# Patient Record
Sex: FEMALE | Race: WHITE | NOT HISPANIC OR LATINO | Employment: OTHER | URBAN - METROPOLITAN AREA
[De-identification: names, ages, dates, MRNs, and addresses within clinical notes are randomized per-mention and may not be internally consistent; named-entity substitution may affect disease eponyms.]

---

## 2024-08-20 ENCOUNTER — HOSPITAL ENCOUNTER (EMERGENCY)
Facility: HOSPITAL | Age: 69
Discharge: HOME | End: 2024-08-20
Attending: EMERGENCY MEDICINE
Payer: COMMERCIAL

## 2024-08-20 ENCOUNTER — APPOINTMENT (EMERGENCY)
Dept: RADIOLOGY | Facility: HOSPITAL | Age: 69
End: 2024-08-20
Payer: COMMERCIAL

## 2024-08-20 VITALS
OXYGEN SATURATION: 95 % | RESPIRATION RATE: 18 BRPM | HEART RATE: 102 BPM | DIASTOLIC BLOOD PRESSURE: 71 MMHG | SYSTOLIC BLOOD PRESSURE: 175 MMHG | TEMPERATURE: 97.7 F

## 2024-08-20 DIAGNOSIS — S62.102A CLOSED FRACTURE OF LEFT WRIST, INITIAL ENCOUNTER: Primary | ICD-10-CM

## 2024-08-20 PROCEDURE — 25605 CLTX DST RDL FX/EPHYS SEP W/: CPT | Performed by: EMERGENCY MEDICINE

## 2024-08-20 PROCEDURE — 99284 EMERGENCY DEPT VISIT MOD MDM: CPT | Performed by: EMERGENCY MEDICINE

## 2024-08-20 PROCEDURE — 99283 EMERGENCY DEPT VISIT LOW MDM: CPT

## 2024-08-20 PROCEDURE — 73110 X-RAY EXAM OF WRIST: CPT

## 2024-08-20 RX ORDER — OXYCODONE HYDROCHLORIDE 5 MG/1
5 TABLET ORAL EVERY 4 HOURS PRN
Qty: 15 TABLET | Refills: 0 | Status: SHIPPED | OUTPATIENT
Start: 2024-08-20 | End: 2024-08-29

## 2024-08-20 RX ORDER — NAPROXEN 500 MG/1
500 TABLET ORAL 2 TIMES DAILY WITH MEALS
Qty: 30 TABLET | Refills: 0 | Status: ON HOLD | OUTPATIENT
Start: 2024-08-20 | End: 2024-08-29

## 2024-08-20 RX ORDER — LIDOCAINE HYDROCHLORIDE 10 MG/ML
20 INJECTION, SOLUTION EPIDURAL; INFILTRATION; INTRACAUDAL; PERINEURAL ONCE
Status: COMPLETED | OUTPATIENT
Start: 2024-08-20 | End: 2024-08-20

## 2024-08-20 RX ADMIN — LIDOCAINE HYDROCHLORIDE 20 ML: 10 INJECTION, SOLUTION EPIDURAL; INFILTRATION; INTRACAUDAL at 16:40

## 2024-08-20 NOTE — ED NOTES
Patient ambulated out of the ED, AAOx4 resp even and unlabored with no S$S of distress.       Rambo Novoa RN  08/20/24 0708

## 2024-08-20 NOTE — ED PROVIDER NOTES
History  Chief Complaint   Patient presents with   • Wrist Injury     [place]. Denies hitting head or LOC.      69-year-old female presents for evaluation with a left wrist injury.  Patient states that she tripped and fell, landing on her outstretched left arm.  She denies any head strike or loss of consciousness.  Patient has a notable deformity to the left wrist, and is endorsing pain in the wrist.  She denies any other injuries from the fall.  Denies any associated numbness, tingling, or weakness in the distal left hand.      None       History reviewed. No pertinent past medical history.    Past Surgical History:   Procedure Laterality Date   • APPENDECTOMY         History reviewed. No pertinent family history.  I have reviewed and agree with the history as documented.    E-Cigarette/Vaping   • E-Cigarette Use Never User      E-Cigarette/Vaping Substances     Social History     Tobacco Use   • Smoking status: Never   • Smokeless tobacco: Never   Vaping Use   • Vaping status: Never Used   Substance Use Topics   • Alcohol use: Never   • Drug use: Never       Review of Systems   Musculoskeletal:  Positive for arthralgias.   All other systems reviewed and are negative.      Physical Exam  Physical Exam  Vitals and nursing note reviewed.   Constitutional:       General: She is awake. She is not in acute distress.     Appearance: She is not toxic-appearing.   HENT:      Head: Normocephalic and atraumatic.   Eyes:      General: Vision grossly intact. Gaze aligned appropriately.   Cardiovascular:      Rate and Rhythm: Normal rate and regular rhythm.   Pulmonary:      Effort: Pulmonary effort is normal. No respiratory distress.   Musculoskeletal:      Left wrist: Swelling, deformity and tenderness present. Decreased range of motion. Normal pulse.      Left hand: Normal range of motion. Normal sensation. Normal capillary refill.      Cervical back: Full passive range of motion  without pain and neck supple.   Skin:     General: Skin is warm and dry.   Neurological:      General: No focal deficit present.      Mental Status: She is alert and oriented to person, place, and time.       Vital Signs  ED Triage Vitals   Temperature Pulse Respirations Blood Pressure SpO2   08/20/24 1534 08/20/24 1534 08/20/24 1534 08/20/24 1534 08/20/24 1534   97.7 °F (36.5 °C) 60 20 (!) 176/79 95 %      Temp Source Heart Rate Source Patient Position - Orthostatic VS BP Location FiO2 (%)   08/20/24 1534 08/20/24 1534 08/20/24 1534 08/20/24 1534 --   Oral Monitor Sitting Left arm       Pain Score       08/20/24 1632       7           Vitals:    08/20/24 1534   BP: (!) 176/79   Pulse: 60   Patient Position - Orthostatic VS: Sitting         Visual Acuity      ED Medications  Medications   lidocaine (PF) (XYLOCAINE-MPF) 1 % injection 20 mL (20 mL Infiltration Given by Other 8/20/24 1640)       Diagnostic Studies  Results Reviewed       None                   XR wrist 3+ views LEFT   ED Interpretation by Katja Parra DO (08/20 1629)   Dorsally displaced distal radius and ulna fractures.      Final Result by Cassandra Vo MD (08/20 2268)   Comminuted intra-articular fracture of the distal radius with dorsally directed distal radial articular surface with radial shortening.   Comminuted fracture of the distal ulna      Computerized Assisted Algorithm (CAA) may have been used to analyze all applicable images.            Workstation performed: [id number]         XR wrist 3+ views LEFT    (Results Pending)              Procedures  Orthopedic injury treatment    Date/Time: 8/20/2024 6:32 PM    Performed by: Katja Parra DO  Authorized by: Katja Parra DO    Patient Location:  ED  [place] Protocol:  Procedure performed by: (ED tech)  Consent: Verbal consent obtained.  Risks and benefits: risks, benefits and alternatives were discussed  Consent given by: patient  Required items: required blood products,  implants, devices, and special equipment available  Patient identity confirmed: arm band    Injury location:  Wrist  Location details:  Left wrist  Injury type:  Fracture  Fracture type: distal radius and ulnar styloid    Fracture type: distal radius and ulnar styloid    Neurovascular status: Neurovascularly intact    Distal perfusion: normal    Neurological function: normal    Range of motion: reduced    Local anesthesia used?: Yes    General anesthesia used?: No    Anesthesia:  Hematoma block  Local anesthetic:  Lidocaine 1% without epinephrine  Anesthetic total (ml):  10  Skeletal traction used?: Yes (Patient hung in finger traps)    Immobilization:  Splint  Splint type:  Sugar tong  Supplies used:  Cotton padding, Ortho-Glass and elastic bandage  Neurovascular status: Neurovascularly intact    Distal perfusion: normal    Neurological function: normal    Range of motion: unchanged    Patient tolerance:  Patient tolerated the procedure well with no immediate complications           ED Course                                               Medical Decision Making  Amount and/or Complexity of Data Reviewed  Radiology: ordered and independent interpretation performed.    Risk  Prescription drug management.               Disposition  Final diagnoses:   None     ED Disposition       None          Follow-up Information    None         Patient's Medications    No medications on file       No discharge procedures on file.    PDMP Review       None            ED Provider  Electronically Signed by

## 2024-08-21 ENCOUNTER — TELEPHONE (OUTPATIENT)
Age: 69
End: 2024-08-21

## 2024-08-21 NOTE — TELEPHONE ENCOUNTER
Hello,  Please advise if the following patient can be forced onto the schedule:  Patient: Kylee BRICENO  INSURANCE: [employer]   Call back #: [phone]  Reason for appointment: Wrist Fracture  Requested doctor/location: [facility] Doctor    Thank you in advance!

## 2024-08-21 NOTE — TELEPHONE ENCOUNTER
Called & spoke to patient. She elected to come in Friday at the [place] office. She is aware of location. Agreed to come in @ 10am. Patient was very pleasant & was grateful.

## 2024-08-23 ENCOUNTER — ANESTHESIA EVENT (OUTPATIENT)
Age: 69
End: 2024-08-23
Payer: COMMERCIAL

## 2024-08-23 ENCOUNTER — OFFICE VISIT (OUTPATIENT)
Dept: OBGYN CLINIC | Facility: CLINIC | Age: 69
End: 2024-08-23
Payer: COMMERCIAL

## 2024-08-23 DIAGNOSIS — S52.502A CLOSED FRACTURE DISTAL RADIUS AND ULNA, LEFT, INITIAL ENCOUNTER: Primary | ICD-10-CM

## 2024-08-23 DIAGNOSIS — S62.102A CLOSED FRACTURE OF LEFT WRIST, INITIAL ENCOUNTER: ICD-10-CM

## 2024-08-23 DIAGNOSIS — S52.602A CLOSED FRACTURE DISTAL RADIUS AND ULNA, LEFT, INITIAL ENCOUNTER: Primary | ICD-10-CM

## 2024-08-23 PROCEDURE — 99204 OFFICE O/P NEW MOD 45 MIN: CPT | Performed by: ORTHOPAEDIC SURGERY

## 2024-08-23 RX ORDER — ACETAMINOPHEN 325 MG/1
975 TABLET ORAL ONCE
Status: CANCELLED | OUTPATIENT
Start: 2024-08-29 | End: 2024-08-23

## 2024-08-23 RX ORDER — CEFAZOLIN SODIUM 2 G/50ML
2000 SOLUTION INTRAVENOUS ONCE
Status: CANCELLED | OUTPATIENT
Start: 2024-08-29 | End: 2024-08-23

## 2024-08-23 RX ORDER — ACETAMINOPHEN 160 MG/1
160 BAR, CHEWABLE ORAL EVERY 6 HOURS PRN
COMMUNITY
End: 2024-08-29

## 2024-08-23 NOTE — H&P
The HAND & UPPER EXTREMITY OFFICE VISIT   Referred By:  Katja Parra, Do  [address] [place] [address]      Chief Complaint:     Left wrist pain    History of Present Illness:   69 y.o., right hand dominant female presents with left wrist pain after trip and fall 3 days prior to presentation.  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  She was diagnosed the fracture had a closed reduction and placed in a splint.  Emergency department note was reviewed today.    Presents today to discuss further treatment options.  She states that her pain has been improving in the splint and she is working on moving her fingers.  She feels that her finger movement has been improving as well.  Denies numbness or tingling.      She lives with her  and is very active.  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  This requires her to use her hand for a lot of functions.      Past Medical History:  History reviewed. No pertinent past medical history.  Past Surgical History:   Procedure Laterality Date    APPENDECTOMY       History reviewed. No pertinent family history.  Social History     Socioeconomic History    Marital status: /Civil Union     Spouse name: Not on file    Number of children: Not on file    Years of education: Not on file    Highest education level: Not on file   Occupational History    Not on file   Tobacco Use    Smoking status: Never    Smokeless tobacco: Never   Vaping Use    Vaping status: Never Used   Substance and Sexual Activity    Alcohol use: Never    Drug use: Never    Sexual activity: Not on file   Other Topics Concern    Not on file   Social History Narrative    Not on file     Social Determinants of Health     Financial Resource Strain: Not on file   Food Insecurity: Not on file   Transportation Needs: Not on file   Physical Activity: Not on file   Stress: Not on file    Social Connections: Unknown (6/18/2024)    Received from [employer]     How often do you feel lonely or isolated from those around you? (Adult - for ages 18 years and over): Not on file   Intimate Partner Violence: Not on file   Housing Stability: Not on file     Scheduled Meds:  Continuous Infusions:No current facility-administered medications for this visit.    PRN Meds:.  Allergies   Allergen Reactions    Penicillins Rash           Physical Examination:    There were no vitals taken for this visit.    Gen: A&Ox3, NAD  Cardiac: regular rate  Chest: non labored breathing  Abdomen: Non-distended    Right Upper Extremity:  Skin CDI  No obvious deformity of the shoulder, arm, elbow, forearm, wrist, hand  Nontender  Composite fist  Sensation intact to light touch in the axillary median, ulnar, and radial nerve distributions  2+RP      Left Upper Extremity:  Splint intact, left on for examination  Exposed skin intact  Intact finger range of motion within the confines of the splint without pain  Tender about the radius  Sensation intact to light touch in the axillary median, ulnar, and radial nerve distributions  Warm well-perfused fingers      Studies:  Radiographs: I personally reviewed and independently interpreted the available radiographs.  8/20/2024: Radiographs of the left wrist, multiple views pre and post reduction attempt, demonstrate displaced comminuted intra-articular distal radius and ulnar head fractures with 100% dorsal translation initially.  The dorsal translation was improved about 50% after postreduction.  Continue significant dorsal tilt and shortening with loss of radial clinician.  Ulnar head fracture and slightly improved alignment.       Assessment and Plan:  1. Closed fracture distal radius and ulna, left, initial encounter  Case request operating room: OPEN REDUCTION W/ INTERNAL FIXATION (ORIF) Left RADIUS / ULNA (WRIST)    Diet NPO; Sips with meds    Nursing  "Communication Warmimg Interventions Implemented    Nursing Communication CHG bath, have staff wash entire body (neck down) per pre-op bathing protocol. Routine, evening prior to, and day of surgery.    Void on call to OR    Insert peripheral IV    acetaminophen (TYLENOL) tablet 975 mg    ceFAZolin (ANCEF) 2,000 mg in sodium chloride 0.9 % 50 mL IVPB    EKG 12 lead    Basic metabolic panel    Case request operating room: OPEN REDUCTION W/ INTERNAL FIXATION (ORIF) Left RADIUS / ULNA (WRIST)      2. Closed fracture of left wrist, initial encounter  Ambulatory Referral to Orthopedic Surgery          69 y.o. female presents with signs and symptoms consistent with the above diagnosis.  We discussed the natural history of this condition and its pathogenesis.  We discussed operative and nonoperative treatment options.    We had an extensive discussion regarding the risks and benefits of operative versus nonoperative management.  We discussed the ASSH and AAOS recommendations for distal radius fractures in patients greater than 65 years old \"considered elderly\".  We discussed the nuances in the literature that are not well represented and this recommendation and I do think that patient activity level and functional age is more important than chronological age.  She is more active than many people younger than 65.  I also do think that surgery would allow for earlier return to activities compared to casting.  We discussed that casting involves at least 2 months of immobilization with the weekly x-rays for the first 3 weeks.  Her fracture is at risk for interval displacement given its very distal nature, intra-articular extension and dorsal comminution as well as the initial amount of fracture displacement and having an associated ulnar fracture.  In her case I do think she would be better served by operative intervention.  She does agree with this.  We discussed the risks of surgery include but not limited to infection, " bleeding, wound healing complications, damage to surrounding structures, hardware failure, nonunion/malunion, stiffness, need for revision surgery, complex regional pain syndrome, anesthetic complications.  She understands these risks and wishes to proceed with surgery.  We will plan for likely bridge plating of the distal radius given the very distal nature of the fracture.  We discussed that this will require plate removal in approximately 3 months postoperatively.  She may also require additional fixation to her ulnar head.    All questions answered and informed consent signed in the office today.    Open reduction internal fixation of her left distal radius fracture and ulnar fracture  Regional anesthesia with sedation    she expressed understanding of the plan and agreed. We encouraged them to contact our office with any questions or concerns.         Chapin Groves MD  Hand and Upper Extremity Surgery        *This note was dictated using Dragon voice recognition software. Please excuse any word substitutions or errors.*

## 2024-08-23 NOTE — H&P (VIEW-ONLY)
The HAND & UPPER EXTREMITY OFFICE VISIT   Referred By:  Katja Parra, Do  [address] [place] [address]      Chief Complaint:     Left wrist pain    History of Present Illness:   69 y.o., right hand dominant female presents with left wrist pain after trip and fall 3 days prior to presentation.  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  She was diagnosed the fracture had a closed reduction and placed in a splint.  Emergency department note was reviewed today.    Presents today to discuss further treatment options.  She states that her pain has been improving in the splint and she is working on moving her fingers.  She feels that her finger movement has been improving as well.  Denies numbness or tingling.      She lives with her  and is very active.  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  This requires her to use her hand for a lot of functions.      Past Medical History:  History reviewed. No pertinent past medical history.  Past Surgical History:   Procedure Laterality Date    APPENDECTOMY       History reviewed. No pertinent family history.  Social History     Socioeconomic History    Marital status: /Civil Union     Spouse name: Not on file    Number of children: Not on file    Years of education: Not on file    Highest education level: Not on file   Occupational History    Not on file   Tobacco Use    Smoking status: Never    Smokeless tobacco: Never   Vaping Use    Vaping status: Never Used   Substance and Sexual Activity    Alcohol use: Never    Drug use: Never    Sexual activity: Not on file   Other Topics Concern    Not on file   Social History Narrative    Not on file     Social Determinants of Health     Financial Resource Strain: Not on file   Food Insecurity: Not on file   Transportation Needs: Not on file   Physical Activity: Not on file   Stress: Not on file    Social Connections: Unknown (6/18/2024)    Received from [employer]     How often do you feel lonely or isolated from those around you? (Adult - for ages 18 years and over): Not on file   Intimate Partner Violence: Not on file   Housing Stability: Not on file     Scheduled Meds:  Continuous Infusions:No current facility-administered medications for this visit.    PRN Meds:.  Allergies   Allergen Reactions    Penicillins Rash           Physical Examination:    There were no vitals taken for this visit.    Gen: A&Ox3, NAD  Cardiac: regular rate  Chest: non labored breathing  Abdomen: Non-distended    Right Upper Extremity:  Skin CDI  No obvious deformity of the shoulder, arm, elbow, forearm, wrist, hand  Nontender  Composite fist  Sensation intact to light touch in the axillary median, ulnar, and radial nerve distributions  2+RP      Left Upper Extremity:  Splint intact, left on for examination  Exposed skin intact  Intact finger range of motion within the confines of the splint without pain  Tender about the radius  Sensation intact to light touch in the axillary median, ulnar, and radial nerve distributions  Warm well-perfused fingers      Studies:  Radiographs: I personally reviewed and independently interpreted the available radiographs.  8/20/2024: Radiographs of the left wrist, multiple views pre and post reduction attempt, demonstrate displaced comminuted intra-articular distal radius and ulnar head fractures with 100% dorsal translation initially.  The dorsal translation was improved about 50% after postreduction.  Continue significant dorsal tilt and shortening with loss of radial clinician.  Ulnar head fracture and slightly improved alignment.       Assessment and Plan:  1. Closed fracture distal radius and ulna, left, initial encounter  Case request operating room: OPEN REDUCTION W/ INTERNAL FIXATION (ORIF) Left RADIUS / ULNA (WRIST)    Diet NPO; Sips with meds    Nursing  "Communication Warmimg Interventions Implemented    Nursing Communication CHG bath, have staff wash entire body (neck down) per pre-op bathing protocol. Routine, evening prior to, and day of surgery.    Void on call to OR    Insert peripheral IV    acetaminophen (TYLENOL) tablet 975 mg    ceFAZolin (ANCEF) 2,000 mg in sodium chloride 0.9 % 50 mL IVPB    EKG 12 lead    Basic metabolic panel    Case request operating room: OPEN REDUCTION W/ INTERNAL FIXATION (ORIF) Left RADIUS / ULNA (WRIST)      2. Closed fracture of left wrist, initial encounter  Ambulatory Referral to Orthopedic Surgery          69 y.o. female presents with signs and symptoms consistent with the above diagnosis.  We discussed the natural history of this condition and its pathogenesis.  We discussed operative and nonoperative treatment options.    We had an extensive discussion regarding the risks and benefits of operative versus nonoperative management.  We discussed the ASSH and AAOS recommendations for distal radius fractures in patients greater than 65 years old \"considered elderly\".  We discussed the nuances in the literature that are not well represented and this recommendation and I do think that patient activity level and functional age is more important than chronological age.  She is more active than many people younger than 65.  I also do think that surgery would allow for earlier return to activities compared to casting.  We discussed that casting involves at least 2 months of immobilization with the weekly x-rays for the first 3 weeks.  Her fracture is at risk for interval displacement given its very distal nature, intra-articular extension and dorsal comminution as well as the initial amount of fracture displacement and having an associated ulnar fracture.  In her case I do think she would be better served by operative intervention.  She does agree with this.  We discussed the risks of surgery include but not limited to infection, " bleeding, wound healing complications, damage to surrounding structures, hardware failure, nonunion/malunion, stiffness, need for revision surgery, complex regional pain syndrome, anesthetic complications.  She understands these risks and wishes to proceed with surgery.  We will plan for likely bridge plating of the distal radius given the very distal nature of the fracture.  We discussed that this will require plate removal in approximately 3 months postoperatively.  She may also require additional fixation to her ulnar head.    All questions answered and informed consent signed in the office today.    Open reduction internal fixation of her left distal radius fracture and ulnar fracture  Regional anesthesia with sedation    she expressed understanding of the plan and agreed. We encouraged them to contact our office with any questions or concerns.         Chapin Groves MD  Hand and Upper Extremity Surgery        *This note was dictated using Dragon voice recognition software. Please excuse any word substitutions or errors.*

## 2024-08-23 NOTE — PROGRESS NOTES
The HAND & UPPER EXTREMITY OFFICE VISIT   Referred By:  Katja Parra, Do  [address] [place] [address]      Chief Complaint:     Left wrist pain    History of Present Illness:   69 y.o., right hand dominant female presents with left wrist pain after trip and fall 3 days prior to presentation.  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  She was diagnosed the fracture had a closed reduction and placed in a splint.  Emergency department note was reviewed today.    Presents today to discuss further treatment options.  She states that her pain has been improving in the splint and she is working on moving her fingers.  She feels that her finger movement has been improving as well.  Denies numbness or tingling.      She lives with her  and is very active.  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  This requires her to use her hand for a lot of functions.      Past Medical History:  History reviewed. No pertinent past medical history.  Past Surgical History:   Procedure Laterality Date    APPENDECTOMY       History reviewed. No pertinent family history.  Social History     Socioeconomic History    Marital status: /Civil Union     Spouse name: Not on file    Number of children: Not on file    Years of education: Not on file    Highest education level: Not on file   Occupational History    Not on file   Tobacco Use    Smoking status: Never    Smokeless tobacco: Never   Vaping Use    Vaping status: Never Used   Substance and Sexual Activity    Alcohol use: Never    Drug use: Never    Sexual activity: Not on file   Other Topics Concern    Not on file   Social History Narrative    Not on file     Social Determinants of Health     Financial Resource Strain: Not on file   Food Insecurity: Not on file   Transportation Needs: Not on file   Physical Activity: Not on file   Stress: Not on file    Social Connections: Unknown (6/18/2024)    Received from [facility]     How often do you feel lonely or isolated from those around you? (Adult - for ages 18 years and over): Not on file   Intimate Partner Violence: Not on file   Housing Stability: Not on file     Scheduled Meds:  Continuous Infusions:No current facility-administered medications for this visit.    PRN Meds:.  Allergies   Allergen Reactions    Penicillins Rash           Physical Examination:    There were no vitals taken for this visit.    Gen: A&Ox3, NAD  Cardiac: regular rate  Chest: non labored breathing  Abdomen: Non-distended    Right Upper Extremity:  Skin CDI  No obvious deformity of the shoulder, arm, elbow, forearm, wrist, hand  Nontender  Composite fist  Sensation intact to light touch in the axillary median, ulnar, and radial nerve distributions  2+RP      Left Upper Extremity:  Splint intact, left on for examination  Exposed skin intact  Intact finger range of motion within the confines of the splint without pain  Tender about the radius  Sensation intact to light touch in the axillary median, ulnar, and radial nerve distributions  Warm well-perfused fingers      Studies:  Radiographs: I personally reviewed and independently interpreted the available radiographs.  8/20/2024: Radiographs of the left wrist, multiple views pre and post reduction attempt, demonstrate displaced comminuted intra-articular distal radius and ulnar head fractures with 100% dorsal translation initially.  The dorsal translation was improved about 50% after postreduction.  Continue significant dorsal tilt and shortening with loss of radial clinician.  Ulnar head fracture and slightly improved alignment.       Assessment and Plan:  1. Closed fracture distal radius and ulna, left, initial encounter  Case request operating room: OPEN REDUCTION W/ INTERNAL FIXATION (ORIF) Left RADIUS / ULNA (WRIST)    Diet NPO; Sips with meds    Nursing  "Communication Warmimg Interventions Implemented    Nursing Communication CHG bath, have staff wash entire body (neck down) per pre-op bathing protocol. Routine, evening prior to, and day of surgery.    Void on call to OR    Insert peripheral IV    acetaminophen (TYLENOL) tablet 975 mg    ceFAZolin (ANCEF) 2,000 mg in sodium chloride 0.9 % 50 mL IVPB    EKG 12 lead    Basic metabolic panel    Case request operating room: OPEN REDUCTION W/ INTERNAL FIXATION (ORIF) Left RADIUS / ULNA (WRIST)      2. Closed fracture of left wrist, initial encounter  Ambulatory Referral to Orthopedic Surgery          69 y.o. female presents with signs and symptoms consistent with the above diagnosis.  We discussed the natural history of this condition and its pathogenesis.  We discussed operative and nonoperative treatment options.    We had an extensive discussion regarding the risks and benefits of operative versus nonoperative management.  We discussed the ASSH and AAOS recommendations for distal radius fractures in patients greater than 65 years old \"considered elderly\".  We discussed the nuances in the literature that are not well represented and this recommendation and I do think that patient activity level and functional age is more important than chronological age.  She is more active than many people younger than 65.  I also do think that surgery would allow for earlier return to activities compared to casting.  We discussed that casting involves at least 2 months of immobilization with the weekly x-rays for the first 3 weeks.  Her fracture is at risk for interval displacement given its very distal nature, intra-articular extension and dorsal comminution as well as the initial amount of fracture displacement and having an associated ulnar fracture.  In her case I do think she would be better served by operative intervention.  She does agree with this.  We discussed the risks of surgery include but not limited to infection, " bleeding, wound healing complications, damage to surrounding structures, hardware failure, nonunion/malunion, stiffness, need for revision surgery, complex regional pain syndrome, anesthetic complications.  She understands these risks and wishes to proceed with surgery.  We will plan for likely bridge plating of the distal radius given the very distal nature of the fracture.  We discussed that this will require plate removal in approximately 3 months postoperatively.  She may also require additional fixation to her ulnar head.    All questions answered and informed consent signed in the office today.    Open reduction internal fixation of her left distal radius fracture and ulnar fracture  Regional anesthesia with sedation    she expressed understanding of the plan and agreed. We encouraged them to contact our office with any questions or concerns.         Chapin Groves MD  Hand and Upper Extremity Surgery        *This note was dictated using Dragon voice recognition software. Please excuse any word substitutions or errors.*

## 2024-08-24 ENCOUNTER — APPOINTMENT (OUTPATIENT)
Dept: LAB | Facility: HOSPITAL | Age: 69
End: 2024-08-24
Payer: COMMERCIAL

## 2024-08-24 DIAGNOSIS — S52.602A CLOSED FRACTURE DISTAL RADIUS AND ULNA, LEFT, INITIAL ENCOUNTER: ICD-10-CM

## 2024-08-24 DIAGNOSIS — S52.502A CLOSED FRACTURE DISTAL RADIUS AND ULNA, LEFT, INITIAL ENCOUNTER: ICD-10-CM

## 2024-08-26 ENCOUNTER — APPOINTMENT (OUTPATIENT)
Dept: LAB | Facility: CLINIC | Age: 69
End: 2024-08-26
Payer: COMMERCIAL

## 2024-08-26 DIAGNOSIS — S52.602A CLOSED FRACTURE DISTAL RADIUS AND ULNA, LEFT, INITIAL ENCOUNTER: ICD-10-CM

## 2024-08-26 DIAGNOSIS — S52.502A CLOSED FRACTURE DISTAL RADIUS AND ULNA, LEFT, INITIAL ENCOUNTER: ICD-10-CM

## 2024-08-26 LAB
ANION GAP SERPL CALCULATED.3IONS-SCNC: 8 MMOL/L (ref 4–13)
BUN SERPL-MCNC: 17 MG/DL (ref 5–25)
CALCIUM SERPL-MCNC: 9 MG/DL (ref 8.4–10.2)
CHLORIDE SERPL-SCNC: 104 MMOL/L (ref 96–108)
CO2 SERPL-SCNC: 27 MMOL/L (ref 21–32)
CREAT SERPL-MCNC: 0.59 MG/DL (ref 0.6–1.3)
GFR SERPL CREATININE-BSD FRML MDRD: 92 ML/MIN/1.73SQ M
GLUCOSE P FAST SERPL-MCNC: 90 MG/DL (ref 65–99)
POTASSIUM SERPL-SCNC: 4.1 MMOL/L (ref 3.5–5.3)
SODIUM SERPL-SCNC: 139 MMOL/L (ref 135–147)

## 2024-08-26 PROCEDURE — 36415 COLL VENOUS BLD VENIPUNCTURE: CPT

## 2024-08-26 PROCEDURE — 80048 BASIC METABOLIC PNL TOTAL CA: CPT

## 2024-08-27 ENCOUNTER — ANESTHESIA EVENT (OUTPATIENT)
Age: 69
End: 2024-08-27

## 2024-08-27 ENCOUNTER — ANESTHESIA (OUTPATIENT)
Age: 69
End: 2024-08-27

## 2024-08-27 LAB
ATRIAL RATE: 61 BPM
P AXIS: 61 DEGREES
PR INTERVAL: 182 MS
QRS AXIS: 54 DEGREES
QRSD INTERVAL: 78 MS
QT INTERVAL: 428 MS
QTC INTERVAL: 430 MS
T WAVE AXIS: 58 DEGREES
VENTRICULAR RATE: 61 BPM

## 2024-08-27 PROCEDURE — 93010 ELECTROCARDIOGRAM REPORT: CPT | Performed by: STUDENT IN AN ORGANIZED HEALTH CARE EDUCATION/TRAINING PROGRAM

## 2024-08-28 PROBLEM — S52.502A CLOSED FRACTURE DISTAL RADIUS AND ULNA, LEFT, INITIAL ENCOUNTER: Status: ACTIVE | Noted: 2024-08-28

## 2024-08-28 PROBLEM — S52.602A CLOSED FRACTURE DISTAL RADIUS AND ULNA, LEFT, INITIAL ENCOUNTER: Status: ACTIVE | Noted: 2024-08-28

## 2024-08-28 RX ORDER — NAPROXEN 500 MG/1
500 TABLET ORAL 2 TIMES DAILY WITH MEALS
Qty: 30 TABLET | Refills: 1 | Status: CANCELLED | OUTPATIENT
Start: 2024-08-28

## 2024-08-28 RX ORDER — METOPROLOL TARTRATE 50 MG
50 TABLET ORAL EVERY 12 HOURS SCHEDULED
COMMUNITY

## 2024-08-28 RX ORDER — LISINOPRIL 10 MG/1
10 TABLET ORAL DAILY
COMMUNITY

## 2024-08-28 RX ORDER — SERTRALINE HYDROCHLORIDE 100 MG/1
100 TABLET, FILM COATED ORAL DAILY
COMMUNITY

## 2024-08-28 NOTE — PRE-PROCEDURE INSTRUCTIONS
Pre-Surgery Instructions:   Medication Instructions    acetaminophen (TYLENOL) 160 MG chewable tablet Uses PRN- OK to take day of surgery    lisinopril (ZESTRIL) 10 mg tablet Hold day of surgery.    metoprolol tartrate (LOPRESSOR) 50 mg tablet Take day of surgery.    naproxen (Naprosyn) 500 mg tablet Hold until after surgery    oxyCODONE (Roxicodone) 5 immediate release tablet Uses PRN- OK to take day of surgery    sertraline (ZOLOFT) 100 mg tablet Take day of surgery.     Medication instructions for day surgery reviewed. Please use only a sip of water to take your instructed medications. Avoid all over the counter vitamins, supplements and NSAIDS for one week prior to surgery per anesthesia guidelines. Tylenol is ok to take as needed.     You will receive a call one business day prior to surgery with an arrival time and hospital directions. If your surgery is scheduled on a Monday, the hospital will be calling you on the Friday prior to your surgery. If you have not heard from anyone by 8pm, please call the hospital supervisor through the hospital  at [phone]. ([place] [phone] or [place] [phone]).    Do not eat or drink anything after midnight the night before your surgery, including candy, mints, lifesavers, or chewing gum. Do not drink alcohol 24hrs before your surgery. Try not to smoke at least 24hrs before your surgery.       Follow the pre surgery showering instructions as listed in the “My Surgical Experience Booklet” or otherwise provided by your surgeon's office. Do not use a blade to shave the surgical area 1 week before surgery. It is okay to use a clean electric clippers up to 24 hours before surgery. Do not apply any lotions, creams, including makeup, cologne, deodorant, or perfumes after showering on the day of your surgery. Do not use dry shampoo, hair spray, hair gel, or any type of hair products.     No contact lenses, eye make-up, or artificial eyelashes. Remove nail  polish, including gel polish, and any artificial, gel, or acrylic nails if possible. Remove all jewelry including rings and body piercing jewelry.     Wear causal clothing that is easy to take on and off. Consider your type of surgery.    Keep any valuables, jewelry, piercings at home. Please bring any specially ordered equipment (sling, braces) if indicated.    Arrange for a responsible person to drive you to and from the hospital on the day of your surgery. Please confirm the visitor policy for the day of your procedure when you receive your phone call with an arrival time.     Call the surgeon's office with any new illnesses, exposures, or additional questions prior to surgery.    Please reference your “My Surgical Experience Booklet” for additional information to prepare for your upcoming surgery.

## 2024-08-28 NOTE — DISCHARGE INSTR - AVS FIRST PAGE
POST-OPERATIVE INSTRUCTIONS   DISTAL RADIUS (WRIST) FRACTURE FIXATION      You have just undergone an open reduction and internal fixation of your distal radius (wrist) fracture by Dr. Groves in the operating room. It is our wish that your postoperative recovery be as quick and comfortable as possible.  Please carefully review the following items that are important in your recovery.    Pain Control:  After any operation, a certain degree of pain is to be expected.  You have been given a prescription for narcotic pain medication, as well as acetaminophen and/or ibuprofen which will relieve most of your pain but may not relieve all of the pain.  Pain medicine may make you drowsy so please curtail your activities appropriately.  Do not drive while taking pain medicine.      When you go home, please keep your operated arm elevated at all times (above the level of your heart.)  If you do this, your swelling will be diminished and your pain will be diminished as well.    You had a nerve block as part of your surgical anesthesia as well as to aid in your post-operative pain control. This nerve block may last 12-36 hrs after surgery. While your block is working, you will not be able to feel or move your operative arm and hand. Please wear your sling while the block is in place, except for changing clothes or hygiene purposes, to protect your arm. As the block wears off you will start to notice pain in your operative extremity. Please take pain medication as soon as you start to notice the block wearing off. This prevents your pain from becoming unmanageable when the block has completely worn off.      Dressing Care:  The splint that you have on your operative extremity should remain on and intact until your first postoperative visit.  After surgery, make sure that your dressing is kept dry.  You can take a shower if you cover your arm with a plastic bag, such as a newspaper bag, and use tape or rubber bands. If your  dressing gets wet, take it off,  place Band-Aids on the wound and re-wrap it with sterile dressing that you can get at a local drug store, then please call the office to have a new splint placed as soon as possible.     Weight Bearing:  You should NOT bear weight through your operative extremity. Do not push off using your operative extremity.     If your fingers are not included in the splint, it is ok to move your free fingers as tolerated to prevent stiffness. You may also use your free fingers to assist with light activities of daily living such as putting on clothes, brushing your teeth and eating.     Please work on these finger range of motions exercises between now and you follow up visit:         Follow up:  If you do not already have one, please call our office for a follow-up appointment. It is best to call the day after surgery to make an appointment in 10-14 days.  At your first postoperative visit, you will be seen by either Dr. Groves, his PA;  or one of their associates and the sutures will be removed     You may also need an appointment to see a hand therapist to optimize your functional result. If this appointment has not already been made for you, this will be determined at your first post operative visit.     When to Call:  It is normal to see minor staining on the hand surgery dressing after surgery. If there is significant bleeding, you are advised to call the office during regular office hours to have this checked.     If you feel that you have a surgical emergency postoperatively that requires immediate attention, please call [phone]. In addition, any emergency can also be handled by the emergency room.      If you have questions regarding your surgery postop that you feel requires attention, please call the office.   If this occurs after our regular office hours, there is a message with instructions to talk to the physician on call.

## 2024-08-29 ENCOUNTER — HOSPITAL ENCOUNTER (OUTPATIENT)
Age: 69
Discharge: HOME | End: 2024-08-29
Payer: COMMERCIAL

## 2024-08-29 ENCOUNTER — ANESTHESIA (OUTPATIENT)
Age: 69
End: 2024-08-29
Payer: COMMERCIAL

## 2024-08-29 ENCOUNTER — HOSPITAL ENCOUNTER (OUTPATIENT)
Age: 69
Setting detail: OUTPATIENT SURGERY
Discharge: HOME | End: 2024-08-29
Attending: ORTHOPAEDIC SURGERY | Admitting: ORTHOPAEDIC SURGERY
Payer: COMMERCIAL

## 2024-08-29 VITALS
WEIGHT: 179.4 LBS | RESPIRATION RATE: 35 BRPM | TEMPERATURE: 97 F | SYSTOLIC BLOOD PRESSURE: 132 MMHG | DIASTOLIC BLOOD PRESSURE: 63 MMHG | HEART RATE: 63 BPM | BODY MASS INDEX: 31.79 KG/M2 | OXYGEN SATURATION: 93 % | HEIGHT: 63 IN

## 2024-08-29 DIAGNOSIS — Z47.89 AFTERCARE FOLLOWING SURGERY OF THE MUSCULOSKELETAL SYSTEM: ICD-10-CM

## 2024-08-29 DIAGNOSIS — S52.502A CLOSED FRACTURE DISTAL RADIUS AND ULNA, LEFT, INITIAL ENCOUNTER: Primary | ICD-10-CM

## 2024-08-29 DIAGNOSIS — S52.602A CLOSED FRACTURE DISTAL RADIUS AND ULNA, LEFT, INITIAL ENCOUNTER: ICD-10-CM

## 2024-08-29 DIAGNOSIS — S52.602A CLOSED FRACTURE DISTAL RADIUS AND ULNA, LEFT, INITIAL ENCOUNTER: Primary | ICD-10-CM

## 2024-08-29 DIAGNOSIS — S52.502A CLOSED FRACTURE DISTAL RADIUS AND ULNA, LEFT, INITIAL ENCOUNTER: ICD-10-CM

## 2024-08-29 DIAGNOSIS — S62.102A CLOSED FRACTURE OF LEFT WRIST, INITIAL ENCOUNTER: ICD-10-CM

## 2024-08-29 PROCEDURE — C1713 ANCHOR/SCREW BN/BN,TIS/BN: HCPCS | Performed by: ORTHOPAEDIC SURGERY

## 2024-08-29 PROCEDURE — 25609 OPTX DST RD XART FX/EP SEP3+: CPT

## 2024-08-29 PROCEDURE — 73110 X-RAY EXAM OF WRIST: CPT

## 2024-08-29 PROCEDURE — 25609 OPTX DST RD XART FX/EP SEP3+: CPT | Performed by: ORTHOPAEDIC SURGERY

## 2024-08-29 DEVICE — IMPLANTABLE DEVICE: Type: IMPLANTABLE DEVICE | Site: WRIST | Status: FUNCTIONAL

## 2024-08-29 DEVICE — ACU-LOC® WRIST SPANNING PLATE, LONG
Type: IMPLANTABLE DEVICE | Site: WRIST | Status: FUNCTIONAL
Brand: ACUMED

## 2024-08-29 RX ORDER — EPHEDRINE SULFATE 50 MG/ML
INJECTION INTRAVENOUS AS NEEDED
Status: DISCONTINUED | OUTPATIENT
Start: 2024-08-29 | End: 2024-08-29

## 2024-08-29 RX ORDER — MIDAZOLAM HYDROCHLORIDE 2 MG/2ML
INJECTION, SOLUTION INTRAMUSCULAR; INTRAVENOUS AS NEEDED
Status: DISCONTINUED | OUTPATIENT
Start: 2024-08-29 | End: 2024-08-29

## 2024-08-29 RX ORDER — DEXAMETHASONE SODIUM PHOSPHATE 10 MG/ML
INJECTION, SOLUTION INTRAMUSCULAR; INTRAVENOUS AS NEEDED
Status: DISCONTINUED | OUTPATIENT
Start: 2024-08-29 | End: 2024-08-29

## 2024-08-29 RX ORDER — ROPIVACAINE HYDROCHLORIDE 5 MG/ML
INJECTION, SOLUTION EPIDURAL; INFILTRATION; PERINEURAL
Status: COMPLETED | OUTPATIENT
Start: 2024-08-29 | End: 2024-08-29

## 2024-08-29 RX ORDER — ACETAMINOPHEN 325 MG/1
650 TABLET ORAL EVERY 6 HOURS PRN
Status: DISCONTINUED | OUTPATIENT
Start: 2024-08-29 | End: 2024-08-29 | Stop reason: HOSPADM

## 2024-08-29 RX ORDER — LIDOCAINE HYDROCHLORIDE 10 MG/ML
0.5 INJECTION, SOLUTION EPIDURAL; INFILTRATION; INTRACAUDAL; PERINEURAL ONCE AS NEEDED
Status: DISCONTINUED | OUTPATIENT
Start: 2024-08-29 | End: 2024-08-29 | Stop reason: HOSPADM

## 2024-08-29 RX ORDER — LIDOCAINE HYDROCHLORIDE 10 MG/ML
INJECTION, SOLUTION EPIDURAL; INFILTRATION; INTRACAUDAL; PERINEURAL AS NEEDED
Status: DISCONTINUED | OUTPATIENT
Start: 2024-08-29 | End: 2024-08-29

## 2024-08-29 RX ORDER — HYDROMORPHONE HCL/PF 1 MG/ML
0.5 SYRINGE (ML) INJECTION
Status: DISCONTINUED | OUTPATIENT
Start: 2024-08-29 | End: 2024-08-29 | Stop reason: HOSPADM

## 2024-08-29 RX ORDER — SODIUM CHLORIDE, SODIUM LACTATE, POTASSIUM CHLORIDE, CALCIUM CHLORIDE 600; 310; 30; 20 MG/100ML; MG/100ML; MG/100ML; MG/100ML
125 INJECTION, SOLUTION INTRAVENOUS CONTINUOUS
Status: DISCONTINUED | OUTPATIENT
Start: 2024-08-29 | End: 2024-08-29 | Stop reason: SDUPTHER

## 2024-08-29 RX ORDER — CEFAZOLIN SODIUM 2 G/50ML
2000 SOLUTION INTRAVENOUS ONCE
Status: COMPLETED | OUTPATIENT
Start: 2024-08-29 | End: 2024-08-29

## 2024-08-29 RX ORDER — ONDANSETRON 4 MG/1
4 TABLET, FILM COATED ORAL EVERY 8 HOURS PRN
Qty: 20 TABLET | Refills: 0 | Status: SHIPPED | OUTPATIENT
Start: 2024-08-29

## 2024-08-29 RX ORDER — SODIUM CHLORIDE, SODIUM LACTATE, POTASSIUM CHLORIDE, CALCIUM CHLORIDE 600; 310; 30; 20 MG/100ML; MG/100ML; MG/100ML; MG/100ML
125 INJECTION, SOLUTION INTRAVENOUS CONTINUOUS
Status: DISCONTINUED | OUTPATIENT
Start: 2024-08-29 | End: 2024-08-29 | Stop reason: HOSPADM

## 2024-08-29 RX ORDER — LABETALOL HYDROCHLORIDE 5 MG/ML
5 INJECTION, SOLUTION INTRAVENOUS
Status: DISCONTINUED | OUTPATIENT
Start: 2024-08-29 | End: 2024-08-29 | Stop reason: HOSPADM

## 2024-08-29 RX ORDER — DOCUSATE SODIUM 100 MG/1
100 CAPSULE, LIQUID FILLED ORAL DAILY PRN
Qty: 10 CAPSULE | Refills: 0 | Status: SHIPPED | OUTPATIENT
Start: 2024-08-29

## 2024-08-29 RX ORDER — OXYCODONE HYDROCHLORIDE 5 MG/1
5 TABLET ORAL EVERY 6 HOURS PRN
Qty: 10 TABLET | Refills: 0 | Status: SHIPPED | OUTPATIENT
Start: 2024-08-29 | End: 2024-09-08

## 2024-08-29 RX ORDER — NAPROXEN 500 MG/1
500 TABLET ORAL 2 TIMES DAILY WITH MEALS
Qty: 30 TABLET | Refills: 1 | Status: SHIPPED | OUTPATIENT
Start: 2024-08-29

## 2024-08-29 RX ORDER — MAGNESIUM HYDROXIDE 1200 MG/15ML
LIQUID ORAL AS NEEDED
Status: DISCONTINUED | OUTPATIENT
Start: 2024-08-29 | End: 2024-08-29 | Stop reason: HOSPADM

## 2024-08-29 RX ORDER — ONDANSETRON 2 MG/ML
4 INJECTION INTRAMUSCULAR; INTRAVENOUS ONCE AS NEEDED
Status: DISCONTINUED | OUTPATIENT
Start: 2024-08-29 | End: 2024-08-29 | Stop reason: HOSPADM

## 2024-08-29 RX ORDER — FENTANYL CITRATE 50 UG/ML
INJECTION, SOLUTION INTRAMUSCULAR; INTRAVENOUS AS NEEDED
Status: DISCONTINUED | OUTPATIENT
Start: 2024-08-29 | End: 2024-08-29

## 2024-08-29 RX ORDER — ACETAMINOPHEN 500 MG
1000 TABLET ORAL EVERY 8 HOURS PRN
Qty: 60 TABLET | Refills: 0 | Status: SHIPPED | OUTPATIENT
Start: 2024-08-29

## 2024-08-29 RX ORDER — PROMETHAZINE HYDROCHLORIDE 25 MG/ML
12.5 INJECTION, SOLUTION INTRAMUSCULAR; INTRAVENOUS ONCE AS NEEDED
Status: DISCONTINUED | OUTPATIENT
Start: 2024-08-29 | End: 2024-08-29 | Stop reason: HOSPADM

## 2024-08-29 RX ORDER — PROPOFOL 10 MG/ML
INJECTION, EMULSION INTRAVENOUS AS NEEDED
Status: DISCONTINUED | OUTPATIENT
Start: 2024-08-29 | End: 2024-08-29

## 2024-08-29 RX ORDER — ACETAMINOPHEN 325 MG/1
975 TABLET ORAL ONCE
Status: COMPLETED | OUTPATIENT
Start: 2024-08-29 | End: 2024-08-29

## 2024-08-29 RX ORDER — FENTANYL CITRATE/PF 50 MCG/ML
25 SYRINGE (ML) INJECTION
Status: DISCONTINUED | OUTPATIENT
Start: 2024-08-29 | End: 2024-08-29 | Stop reason: HOSPADM

## 2024-08-29 RX ORDER — ONDANSETRON 2 MG/ML
INJECTION INTRAMUSCULAR; INTRAVENOUS AS NEEDED
Status: DISCONTINUED | OUTPATIENT
Start: 2024-08-29 | End: 2024-08-29

## 2024-08-29 RX ORDER — IBUPROFEN 600 MG/1
600 TABLET, FILM COATED ORAL EVERY 6 HOURS PRN
Status: DISCONTINUED | OUTPATIENT
Start: 2024-08-29 | End: 2024-08-29 | Stop reason: HOSPADM

## 2024-08-29 RX ORDER — PROPOFOL 10 MG/ML
INJECTION, EMULSION INTRAVENOUS CONTINUOUS PRN
Status: DISCONTINUED | OUTPATIENT
Start: 2024-08-29 | End: 2024-08-29

## 2024-08-29 RX ORDER — OXYCODONE HYDROCHLORIDE 5 MG/1
5 TABLET ORAL EVERY 6 HOURS PRN
Status: DISCONTINUED | OUTPATIENT
Start: 2024-08-29 | End: 2024-08-29 | Stop reason: HOSPADM

## 2024-08-29 RX ORDER — ALBUTEROL SULFATE 0.83 MG/ML
2.5 SOLUTION RESPIRATORY (INHALATION) ONCE AS NEEDED
Status: DISCONTINUED | OUTPATIENT
Start: 2024-08-29 | End: 2024-08-29 | Stop reason: HOSPADM

## 2024-08-29 RX ADMIN — PROPOFOL 130 MCG/KG/MIN: 10 INJECTION, EMULSION INTRAVENOUS at 09:49

## 2024-08-29 RX ADMIN — EPHEDRINE SULFATE 10 MG: 50 INJECTION, SOLUTION INTRAVENOUS at 09:59

## 2024-08-29 RX ADMIN — CEFAZOLIN SODIUM 2000 MG: 2 SOLUTION INTRAVENOUS at 09:50

## 2024-08-29 RX ADMIN — DEXAMETHASONE SODIUM PHOSPHATE 10 MG: 10 INJECTION INTRAMUSCULAR; INTRAVENOUS at 09:55

## 2024-08-29 RX ADMIN — ROPIVACAINE HYDROCHLORIDE 30 ML: 5 INJECTION EPIDURAL; INFILTRATION; PERINEURAL at 09:35

## 2024-08-29 RX ADMIN — FENTANYL CITRATE 25 MCG: 50 INJECTION INTRAMUSCULAR; INTRAVENOUS at 10:45

## 2024-08-29 RX ADMIN — SODIUM CHLORIDE, SODIUM LACTATE, POTASSIUM CHLORIDE, AND CALCIUM CHLORIDE 125 ML/HR: .6; .31; .03; .02 INJECTION, SOLUTION INTRAVENOUS at 09:18

## 2024-08-29 RX ADMIN — FENTANYL CITRATE 25 MCG: 50 INJECTION INTRAMUSCULAR; INTRAVENOUS at 11:21

## 2024-08-29 RX ADMIN — FENTANYL CITRATE 25 MCG: 50 INJECTION INTRAMUSCULAR; INTRAVENOUS at 09:55

## 2024-08-29 RX ADMIN — PROPOFOL 50 MG: 10 INJECTION, EMULSION INTRAVENOUS at 09:49

## 2024-08-29 RX ADMIN — ONDANSETRON 4 MG: 2 INJECTION INTRAMUSCULAR; INTRAVENOUS at 09:49

## 2024-08-29 RX ADMIN — EPHEDRINE SULFATE 10 MG: 50 INJECTION, SOLUTION INTRAVENOUS at 10:43

## 2024-08-29 RX ADMIN — MIDAZOLAM 2 MG: 1 INJECTION INTRAMUSCULAR; INTRAVENOUS at 09:32

## 2024-08-29 RX ADMIN — LIDOCAINE HYDROCHLORIDE 50 MG: 10 INJECTION, SOLUTION EPIDURAL; INFILTRATION; INTRACAUDAL; PERINEURAL at 09:49

## 2024-08-29 RX ADMIN — FENTANYL CITRATE 25 MCG: 50 INJECTION INTRAMUSCULAR; INTRAVENOUS at 11:12

## 2024-08-29 RX ADMIN — SODIUM CHLORIDE, SODIUM LACTATE, POTASSIUM CHLORIDE, AND CALCIUM CHLORIDE: .6; .31; .03; .02 INJECTION, SOLUTION INTRAVENOUS at 11:22

## 2024-08-29 RX ADMIN — ACETAMINOPHEN 325MG 975 MG: 325 TABLET ORAL at 09:16

## 2024-08-29 NOTE — ANESTHESIA PREPROCEDURE EVALUATION
Procedure:  OPEN REDUCTION W/ INTERNAL FIXATION (ORIF) Left RADIUS / ULNA (WRIST) (Left: Wrist)    Relevant Problems   No relevant active problems        Physical Exam    Airway    Mallampati score: II  TM Distance: >3 FB  Neck ROM: full     Dental   No notable dental hx     Cardiovascular      Pulmonary      Other Findings  post-pubertal.      Anesthesia Plan  ASA Score- 2     Anesthesia Type- regional with ASA Monitors.         Additional Monitors:     Airway Plan:     Comment: Patient seen and examined.  History reviewed.  Patient to be done under TIVA and routine monitors.  Supraclavicular block to be performed preoperatively for post-op pain.  Risks discussed with the patient. Consent obtained..       Plan Factors-Exercise tolerance (METS): >4 METS.    Chart reviewed. EKG reviewed.  Existing labs reviewed. Patient summary reviewed.                  Induction- intravenous.    Postoperative Plan- Plan for postoperative opioid use.         Informed Consent- Anesthetic plan and risks discussed with patient.  I personally reviewed this patient with the CRNA. Discussed and agreed on the Anesthesia Plan with the CRNA..

## 2024-08-29 NOTE — ANESTHESIA POSTPROCEDURE EVALUATION
Post-Op Assessment Note    CV Status:  Stable    Pain management: adequate       Mental Status:  Alert and awake   Hydration Status:  Euvolemic   PONV Controlled:  Controlled   Airway Patency:  Patent     Post Op Vitals Reviewed: Yes    No anethesia notable event occurred.    Staff: CRNA               BP   122/68   Temp   97.7   Pulse  53   Resp   16   SpO2   96%

## 2024-08-29 NOTE — ANESTHESIA PROCEDURE NOTES
Peripheral Block    Patient location during procedure: holding area  Start time: 8/29/2024 9:30 AM  Reason for block: at surgeon's request and post-op pain management  Staffing  Performed by: Oleg Bryant MD  Authorized by: Oleg Bryant MD    Preanesthetic Checklist  Completed: patient identified, IV checked, site marked, risks and benefits discussed, surgical consent, monitors and equipment checked, pre-op evaluation and timeout performed  Peripheral Block  Patient position: sitting  Prep: ChloraPrep  Patient monitoring: frequent blood pressure checks, continuous pulse oximetry and heart rate  Block type: Supraclavicular  Laterality: left  Injection technique: single-shot  Procedures: ultrasound guided, Ultrasound guidance required for the procedure to increase accuracy and safety of medication placement and decrease risk of complications.  Ultrasound permanent image saved  ropivacaine (NAROPIN) 0.5 % injection 20 mL - Perineural   30 mL - 8/29/2024 9:35:00 AM  Needle  Needle type: Stimuplex   Needle gauge: 20 G  Needle length: 4 in  Needle localization: anatomical landmarks and ultrasound guidance  Needle insertion depth: 4 cm  Assessment  Injection assessment: incremental injection, frequent aspiration, injected with ease, negative aspiration, negative for heart rate change, no paresthesia on injection, no symptoms of intraneural/intravenous injection and needle tip visualized at all times  Paresthesia pain: none  Post-procedure:  site cleaned  patient tolerated the procedure well with no immediate complications

## 2024-08-29 NOTE — INTERVAL H&P NOTE
H&P reviewed. After examining the patient I find no changes in the patients condition since the H&P had been written.  ROS neg except as documented in HPI  Vitals:    08/29/24 0931   BP: (!) 176/71   Pulse: 61   Resp: 16   Temp:    SpO2: 96%

## 2024-08-29 NOTE — OP NOTE
OPERATIVE REPORT  PATIENT NAME: Kylee Eden    :  1955  MRN: [id number]  Pt Location: [facility] OR ROOM 06    SURGERY DATE: 2024    Surgeons and Role:     * Chapin Groves MD - Primary     * Yari Slaughter PA-C - Assisting    Preop Diagnosis:  Closed fracture distal radius and ulna, left, initial encounter [S52.502A, S52.602A]    Post-Op Diagnosis Codes:     * Closed fracture distal radius and ulna, left, initial encounter [S52.502A, S52.602A]    Procedure(s):  Left - OPEN REDUCTION W/ INTERNAL FIXATION (ORIF) Left RADIUS / ULNA (WRIST)    Specimen(s):  * No specimens in log *    Estimated Blood Loss:   15 mL    Drains:  * No LDAs found *    Anesthesia Type:   Regional with Sedation    Operative Indications:  Closed fracture distal radius and ulna, left, initial encounter [S52.502A, S52.602A]    Comminuted intra-articular distal radius fracture with >3 parts and very small articular fragements, also with associated ulnar head/neck fracture adding to instability. Due to instability and an unacceptable alignment status post closed reduction, we had a long discussion regarding the risks and benefits of operative intervention versus nonoperative management with casting.  She elected to proceed with operative intervention as she is very active and physiologically younger than 69.    Operative Findings:  Comminuted intra-articular distal radius fracture, greater than 3 parts, intra-articular ulnar head and neck fracture      Complications:   None    Procedure and Technique:  The patient was greeted in the preoperative area. The risks, benefits, and alternatives of the procedure were discussed in detail with the patient. Risks include pain, stiffness, swelling, injury to neurovascular structure, infection, need for reoperation, and anesthetic complications. The patient was amenable and signed the informed consent. The operative extremity was marked.  A peripheral nerve block was placed by anesthesia.   Patient was brought to the operating room and placed under anesthesia. A tourniquet was applied. The operative extremity was prepped and draped in the usual sterile fashion. A timeout was performed identifying the name and laterality of the procedure. The limb was exsanguinated and the tourniquet was inflated.     We first obtained a provisional reduction of the distal radius with manual traction and manipulation.    We turned our attention to the dorsum of the hand. A 3cm incision was made over the third metacarpal. Dissection was carried down to the extensor tendons with care taken to protect dorsal sensory nerve branches. The extensor tendons were retracted and the periosteum was incised to expose the dorsal cortex.     We then made a second 3cm incision over Omar's tubercle.  Dissection was carried down to the extensor retinaculum.  The extensor retinaculum was incised just ulnar to Omar's tubercle and the EPL was identified.  The EPL was released along its course and transposed radially.  We incised the retinaculum of the fourth compartment and retracted the extensor tendons ulnarly.  We then used a periosteal elevator to clear space below the fourth compartment connecting our first and second incisions over the distal radial periosteum.  Comminuted dorsal distal radius metaphyseal fragments were identified.    We then chose our dorsal spanning plate and placed it over the dorsal aspect of the wrist.  The most proximal hole cluster over the radial shaft was marked on the skin using a skin marker after traction was applied.  We then made a 3cm incision in line with this cluster.  Subcutaneous tissue was dissected and the muscular fascia of the forearm was split.  We identified the interval between the brachioradialis and the ECRL.  The superficial branch of the radial nerve was identified and protected.  We dissected down to the dorsum of the radial shaft.  Once again we took the periosteal elevator and ran  it distally over the radial shaft, connecting our second and third incisions.    We then tunneled our plate from distal to proximal, taking care to retract the extensor tendons in the middle window and ensure they were not entrapped under the plate.  Under fluoroscopy we centered the distal aspect of the plate over the third metacarpal and noted that it did have good trajectory to remain over the proximal aspect of the radial shaft in our proximal incision.  We then placed a cortical screw in the oblong hole into the third metacarpal shaft.    At this point we reduced our fracture with both traction and ulnar deviation and supination.  We used a serrated bone clamp to hold the proximal aspect of the plate down to the radial shaft.  We took fluoroscopic images in the AP and lateral plane noting proper reduction of the distal radius articular fragments, and we had regained appropriate height, radial inclination, and volar tilt.  We noted that the sigmoid notch was turned in the dorsal ulnar fragment and a small window was made dorsally in the distal radial ulnar joint and a [place] was used to reduce this dorsal ulnar corner piece as well as the ulnar head fragment.    We then placed two locking screws in the middle screw cluster within the distal articular segment to lock this reduction in place. We then placed a cortical screw in the proximal cluster to finalize the plate position. Three additional locking screws were then placed in the third metacarpal, and three additional cortical screws were placed in the shaft.  Final fluoroscopic images confirmed implant placement and appropriate fracture reduction.  The wrist was then examined under live fluoroscopy and the distal ulna was stable at the sigmoid notch and was unable to be significantly displaced with volar and dorsal as well as radial ulnar stress on the ulnar shaft.  Due to the stability and the small fragments in the ulnar head I felt that any fixation of the  ulnar head and neck would be at a higher risk of failure.  I felt that this would heal well due to the immobilization of the wrist in general from the bridge plate.  No further fixation was indicated.    The tourniquet was released and hemostasis achieved. The wound was closed in layers in an interrupted fashion including the dorsal DRUJ capsule in a pants over vest fashion. Sterile dressings and a removable wrist brace were applied. The patient was awoken and transported to the recovery room in stable condition.      I was present for the entire procedure., A qualified resident physician was not available., and A physician assistant was required during the procedure for retraction, tissue handling, dissection and suturing.    Patient Disposition:  PACU         SIGNATURE: Chapin Groves MD  DATE: August 29, 2024  TIME: 11:35 AM

## 2024-08-30 ENCOUNTER — TELEPHONE (OUTPATIENT)
Dept: OBGYN CLINIC | Facility: CLINIC | Age: 69
End: 2024-08-30

## 2024-08-30 ENCOUNTER — TELEPHONE (OUTPATIENT)
Dept: OBGYN CLINIC | Facility: HOSPITAL | Age: 69
End: 2024-08-30

## 2024-08-30 NOTE — TELEPHONE ENCOUNTER
Call patient to check on her following surgery and return her phone call.  She is doing well she had questions about whether or not she should be taking the Tylenol.  So far she has taken 1 naproxen and 1 oxycodone for pain control and is doing well without significant discomfort.  I recommended she take the Tylenol and the naproxen scheduled and then take the oxycodone as needed.  Safe for her to take Pepcid if needed for heartburn while taking Tylenol and naproxen.  All questions answered    Chapin Groves MD

## 2024-08-30 NOTE — TELEPHONE ENCOUNTER
Caller: Patient    Doctor: Yaneli    Reason for call: Patient called stated she had sx on 8/29/24 left wrist. Patient is asking how to take her pain medication and if she can take pepcid with it. Please advise patient.    Call back#:[phone]   no

## 2024-09-13 ENCOUNTER — OFFICE VISIT (OUTPATIENT)
Dept: OBGYN CLINIC | Facility: CLINIC | Age: 69
End: 2024-09-13

## 2024-09-13 VITALS — BODY MASS INDEX: 31.71 KG/M2 | WEIGHT: 179 LBS | HEIGHT: 63 IN

## 2024-09-13 DIAGNOSIS — S52.502A CLOSED FRACTURE DISTAL RADIUS AND ULNA, LEFT, INITIAL ENCOUNTER: Primary | ICD-10-CM

## 2024-09-13 DIAGNOSIS — S52.602A CLOSED FRACTURE DISTAL RADIUS AND ULNA, LEFT, INITIAL ENCOUNTER: Primary | ICD-10-CM

## 2024-09-13 PROCEDURE — 99024 POSTOP FOLLOW-UP VISIT: CPT | Performed by: ORTHOPAEDIC SURGERY

## 2024-09-13 NOTE — ASSESSMENT & PLAN NOTE
Assessment:  Patient 2 weeks s/p ORIF L Radius doing well.    Plan:  Xrays taken in office today.  Sutures out today.  Soft splint applied.  Progress activities with a 5 lb weight restriction.   Continue with ROM of fingers.   Follow up in 6 weeks.    Follow Up:  Repeat xrays at follow up.     Orders:    XR wrist 3+ vw left; Future

## 2024-09-13 NOTE — PROGRESS NOTES
"Ambulatory Visit  Name: Kylee Eden      : 1955      MRN: [id number]  Encounter Provider: Chapin Groves MD  Encounter Date: 2024   Encounter department: [facility] [place]    Assessment & Plan  Closed fracture distal radius and ulna, left, initial encounter  Assessment:  Patient 2 weeks s/p ORIF L Radius doing well.    Plan:  Xrays taken in office today.  Sutures out today.  Soft splint applied.  Progress activities with a 5 lb weight restriction.   Continue with ROM of fingers.   Follow up in 6 weeks.    Follow Up:  Repeat xrays at follow up.     Orders:    XR wrist 3+ vw left; Future      History of Present Illness     Kylee Eden is a 69 y.o. female who presents 2 weeks s/p ORIF L Radius doing well with minimal pain well controlled with PRN tylenol. Patient denies recent fever/chills, erythema or drainage from the incision site, and numbness and tingling. Patient does not report stiffness and states she has been ranging her fingers.       Review of Systems        Objective     Ht 5' 3\" (1.6 m)   Wt 81.2 kg (179 lb)   BMI 31.71 kg/m²     Physical Exam  LUE  Incision sites clean, dry, and intact. Skin edges well approximated, skin healed at this time.   SILT in median, radial, ulnar, and musculocutaneous nerve distributions.  Motor intact in median, radial, ulnar, AIN, PIN.   ROM with wrist flexion and extension limited 2/2 wrist spanning construct.   Able to make 50% of a composite fist.   No TTP along the fracture site.   Capillary refill <2 seconds.     Xray:  Radiographs of the L hand obtained and reviewed today, demonstrating maintenance of alignment without evidence of implant failure or displacement.       "

## 2024-10-02 NOTE — ED PROVIDER NOTES
Final diagnoses:   Closed fracture of left wrist, initial encounter     ED Disposition       ED Disposition   Discharge    Condition   Stable    Date/Time   Tue Aug 20, 2024  6:49 PM    Comment   Kylee Eden discharge to home/self care.                   Assessment & Plan       Medical Decision Making  69-year-old female presents for evaluation with a left wrist injury.  On exam, patient has a notable deformity to the left wrist.  Left hand is neurovascularly intact with intact sensation and motor function throughout.  X-ray of the wrist was obtained and shows comminuted intra-articular distal radius and ulna fractures with dorsal displacement of the distal radial fragment. Hematoma block performed. Patient then placed in finger traps and arm hung with with added weights for several minutes. Patient was then splinted and direct pressure was applied to the fracture to help reduce it further. Repeat xray showed some improvement in alignment, but not complete improvement. Unstable fracture, so do not suspect that there will be much more improvement with further attempts at reduction.  Patient remained neurovascularly intact after reduction and splinting.  Patient was given orthopedic follow-up.  Prescriptions for naproxen and oxycodone sent, and patient was given symptomatic care instructions.  Patient discharged home in stable condition with strict ED return precautions.    Amount and/or Complexity of Data Reviewed  Radiology: ordered and independent interpretation performed.    Risk  Prescription drug management.             Medications   lidocaine (PF) (XYLOCAINE-MPF) 1 % injection 20 mL (20 mL Infiltration Given by Other 8/20/24 1640)       ED Risk Strat Scores                                               History of Present Illness       Chief Complaint   Patient presents with    Wrist Injury     [place] Denies hitting head or LOC.        Past Medical History:    Diagnosis Date    Hypertension       Past Surgical History:   Procedure Laterality Date    APPENDECTOMY      [place] OPTX DSTL RADL I-ARTIC FX/EPIPHYSL SEP 3 FRAG Left 8/29/2024    Procedure: OPEN REDUCTION W/ INTERNAL FIXATION (ORIF) Left RADIUS / ULNA (WRIST);  Surgeon: Chapin Groves MD;  Location: [facility] OR;  Service: Orthopedics      History reviewed. No pertinent family history.   Social History     Tobacco Use    Smoking status: Never    Smokeless tobacco: Never   Vaping Use    Vaping status: Never Used   Substance Use Topics    Alcohol use: Never    Drug use: Never      E-Cigarette/Vaping    E-Cigarette Use Never User       E-Cigarette/Vaping Substances    Nicotine No     THC No     CBD No     Flavoring No     Other No       I have reviewed and agree with the history as documented.     69-year-old female presents for evaluation with a left wrist injury.  Patient states [place], falling forward, catching herself with her outstretched left hand.  Patient endorses pain and pain obvious deformity of the left wrist.  She denies any pain in the rest of the left upper extremity.  Denies any head strike or loss of consciousness.  Patient denies any pain elsewhere.  She denies any associated numbness, tingling, or weakness in her left upper extremity.        Review of Systems   Constitutional:  Negative for chills and fever.   Respiratory:  Negative for shortness of breath.    Cardiovascular:  Negative for chest pain.   Gastrointestinal:  Negative for abdominal pain, nausea and vomiting.   Musculoskeletal:  Positive for arthralgias.   Skin:  Negative for rash.   Neurological:  Negative for dizziness, weakness, numbness and headaches.   All other systems reviewed and are negative.          Objective       ED Triage Vitals   Temperature Pulse Blood Pressure Respirations SpO2 Patient Position - Orthostatic VS   08/20/24 1534 08/20/24 1534 08/20/24 1534 08/20/24 1534  08/20/24 1534 08/20/24 1534   97.7 °F (36.5 °C) 60 (!) 176/79 20 95 % Sitting      Temp Source Heart Rate Source BP Location FiO2 (%) Pain Score    08/20/24 1534 08/20/24 1534 08/20/24 1534 -- 08/20/24 1632    Oral Monitor Left arm  7      Vitals      Date and Time Temp Pulse SpO2 Resp BP Pain Score FACES Pain Rating User   08/20/24 1905 -- 102 95 % 18 175/71 -- -- LA   08/20/24 1632 -- -- -- -- -- 7 -- CC   08/20/24 1534 97.7 °F (36.5 °C) 60 95 % 20 176/79 -- -- AS            Physical Exam  Vitals and nursing note reviewed.   Constitutional:       General: She is awake. She is not in acute distress.     Appearance: She is not toxic-appearing.   HENT:      Head: Normocephalic and atraumatic.   Eyes:      General: Vision grossly intact. Gaze aligned appropriately.   Cardiovascular:      Rate and Rhythm: Normal rate and regular rhythm.   Pulmonary:      Effort: Pulmonary effort is normal. No respiratory distress.   Musculoskeletal:      Left shoulder: No tenderness.      Left upper arm: No tenderness.      Left elbow: No tenderness.      Left wrist: Deformity and tenderness present. No snuff box tenderness. Decreased range of motion. Normal pulse.      Left hand: No swelling, deformity or tenderness. Normal strength. Normal sensation. Normal capillary refill.      Cervical back: Full passive range of motion without pain and neck supple.   Skin:     General: Skin is warm and dry.   Neurological:      General: No focal deficit present.      Mental Status: She is alert and oriented to person, place, and time.         Results Reviewed       None            XR wrist 3+ views LEFT   Final Interpretation by Cassandra Vo MD (08/21 0848)   Comminuted intra-articular fracture of the distal radius. Comminuted fracture distal ulna.   Alignment stable         Computerized Assisted Algorithm (CAA) may have been used to analyze all applicable images.            Workstation performed: [id number]         XR wrist 3+ views LEFT    ED Interpretation by Katja Parra DO (08/20 1629)   Dorsally displaced distal radius and ulna fractures.      Final Interpretation by Cassandra Vo MD (08/20 9478)   Comminuted intra-articular fracture of the distal radius with dorsally directed distal radial articular surface with radial shortening.   Comminuted fracture of the distal ulna      Computerized Assisted Algorithm (CAA) may have been used to analyze all applicable images.            Workstation performed: [id number]             Orthopedic injury treatment    Date/Time: 8/20/2024 6:23 PM    Performed by: Katja Parra DO  Authorized by: Katja Parra DO    Patient Location:  ED  [place] Protocol:  procedure performed by consultantConsent: Verbal consent obtained.  Risks and benefits: risks, benefits and alternatives were discussed  Consent given by: patient  Required items: required blood products, implants, devices, and special equipment available  Patient identity confirmed: arm band    Injury location:  Wrist  Location details:  Left wrist  Injury type:  Fracture  Fracture type: distal radius and ulnar styloid    Fracture type: distal radius and ulnar styloid    Neurovascular status: Neurovascularly intact    Distal perfusion: normal    Neurological function: normal    Range of motion: reduced    Local anesthesia used?: Yes    Anesthesia:  Hematoma block  Local anesthetic:  Lidocaine 1% without epinephrine  Anesthetic total (ml):  10  Manipulation performed?: Yes    Skeletal traction used?: Yes    Reduction successful: Improved alignment, but not fully reduced..    Confirmation: Reduction confirmed by x-ray    Immobilization:  Splint  Splint type:  Sugar tong  Supplies used:  Cotton padding, Ortho-Glass and elastic bandage  Neurovascular status: Neurovascularly intact    Distal perfusion: normal    Neurological function: normal    Range of motion: unchanged    Patient tolerance:  Patient tolerated the procedure well with no immediate  complications      ED Medication and Procedure Management   None     Discharge Medication List as of 8/20/2024  6:56 PM        START taking these medications    Details   naproxen (Naprosyn) 500 mg tablet Take 1 tablet (500 mg total) by mouth 2 (two) times a day with meals, Starting Tue 8/20/2024, Normal      oxyCODONE (Roxicodone) 5 immediate release tablet Take 1 tablet (5 mg total) by mouth every 4 (four) hours as needed for moderate pain for up to 10 days Max Daily Amount: 30 mg, Starting Tue 8/20/2024, Until Fri 8/30/2024 at 2359, Normal             ED SEPSIS DOCUMENTATION   Time reflects when diagnosis was documented in both MDM as applicable and the Disposition within this note       Time User Action Codes Description Comment    8/20/2024  6:49 PM Katja Parra Add [S62.102A] Closed fracture of left wrist, initial encounter                  Katja Parra, DO  10/02/24 1126

## 2024-10-25 ENCOUNTER — OFFICE VISIT (OUTPATIENT)
Dept: OBGYN CLINIC | Facility: CLINIC | Age: 69
End: 2024-10-25

## 2024-10-25 VITALS
WEIGHT: 179 LBS | SYSTOLIC BLOOD PRESSURE: 122 MMHG | HEIGHT: 63 IN | DIASTOLIC BLOOD PRESSURE: 80 MMHG | BODY MASS INDEX: 31.71 KG/M2

## 2024-10-25 DIAGNOSIS — S52.602A CLOSED FRACTURE DISTAL RADIUS AND ULNA, LEFT, INITIAL ENCOUNTER: Primary | ICD-10-CM

## 2024-10-25 DIAGNOSIS — S52.502A CLOSED FRACTURE DISTAL RADIUS AND ULNA, LEFT, INITIAL ENCOUNTER: Primary | ICD-10-CM

## 2024-10-25 PROCEDURE — 99024 POSTOP FOLLOW-UP VISIT: CPT | Performed by: ORTHOPAEDIC SURGERY

## 2024-10-25 NOTE — PROGRESS NOTES
HAND & UPPER EXTREMITY OFFICE VISIT   Referred By:  No referring provider defined for this encounter.      Chief Complaint:     Left wrist pain  DOI 8/20    Surgery:  Surgery Date: 8/29/2024 - OPEN REDUCTION W/ INTERNAL FIXATION (ORIF) Left RADIUS / ULNA (WRIST) - Left     History of Present Illness:   Patient presents now 2 months status post the above surgery. she reports doing well. She has been wearing her brace as directed. She denies any numbness or tingling. She does notes some aching and shooting pains with certain motions. She is not quite able to form a full fist due to feeling a pulling sensation over the dorsal hand.  She has been worried about trying to push it to make a full fist as she did not want to cause any damage.    Past Medical History:  Past Medical History:   Diagnosis Date    Hypertension      Past Surgical History:   Procedure Laterality Date    APPENDECTOMY      [place] OPTX DSTL RADL I-ARTIC FX/EPIPHYSL SEP 3 FRAG Left 8/29/2024    Procedure: OPEN REDUCTION W/ INTERNAL FIXATION (ORIF) Left RADIUS / ULNA (WRIST);  Surgeon: Chapin Groves MD;  Location: [facility] OR;  Service: Orthopedics     History reviewed. No pertinent family history.  Social History     Socioeconomic History    Marital status: /Civil Union     Spouse name: Not on file    Number of children: Not on file    Years of education: Not on file    Highest education level: Not on file   Occupational History    Not on file   Tobacco Use    Smoking status: Never    Smokeless tobacco: Never   Vaping Use    Vaping status: Never Used   Substance and Sexual Activity    Alcohol use: Never    Drug use: Never    Sexual activity: Not on file   Other Topics Concern    Not on file   Social History Narrative    Not on file     Social Determinants of Health     Financial Resource Strain: Not on file   Food Insecurity: Not on file   Transportation Needs: Not on file   Physical Activity: Not on file   Stress: Not on file   Social  "Connections: Unknown (6/18/2024)    Received from [employer]     How often do you feel lonely or isolated from those around you? (Adult - for ages 18 years and over): Not on file   Intimate Partner Violence: Not on file   Housing Stability: Not on file     Scheduled Meds:  Continuous Infusions:No current facility-administered medications for this visit.    PRN Meds:.  Allergies   Allergen Reactions    Penicillins Rash       Physical Examination:    /80   Ht 5' 3\" (1.6 m)   Wt 81.2 kg (179 lb)   BMI 31.71 kg/m²     Gen: A&Ox3, NAD      Left Upper Extremity:  Incision site well-healed without signs of infection   Swelling Negative  Sensation intact to light touch in the axillary median, ulnar, and radial nerve distributions  Able to make about 75% fist due to stiffness and hesitation  Warm, well-perfused digits  Cap refill <2s      Studies:  Radiographs: I personally reviewed and independently interpreted the available radiographs.  10/25/24: Radiographs of the left wrist, multiple views, demonstrate stable alignment of her distal radius and ulnar fractures.  There is interval healing of the ulnar fracture and some interval healing of the radius fracture but there is still an obvious fracture line.  Alignment seems unchanged with stable hardware.    Assessment and Plan:  1. Closed fracture distal radius and ulna, left, initial encounter  XR wrist 3+ vw left          69 y.o. female presents 2 months status post OPEN REDUCTION W/ INTERNAL FIXATION (ORIF) Left RADIUS / ULNA (WRIST) - Left. Encouraged patient to continue working on finger ROM as much as tolerated, and she can push through some of the tight sensation. She should continue to limit lifting more than 5 lbs with the left hand. She may continue applying lotions/ointments as needed for scar massage.  It is recommended she return to the office in 4 weeks for repeat x-rays, and consider plate removal at that time.  She is interested " in leaving her plate in place through the holidays and potentially having it taken out in January instead of November but we will rediscuss this next visit.    she expressed understanding of the plan and agreed. We encouraged them to contact our office with any questions or concerns.         Chapin Groves MD  Hand and Upper Extremity Surgery          *This note was dictated using Dragon voice recognition software. Please excuse any word substitutions or errors.*      Scribe Attestation      I,:  Yari Slaughter PA-C am acting as a scribe while in the presence of the attending physician.:       I,:  Chapin Groves MD personally performed the services described in this documentation    as scribed in my presence.:

## 2024-11-26 ENCOUNTER — APPOINTMENT (OUTPATIENT)
Dept: RADIOLOGY | Facility: MEDICAL CENTER | Age: 69
End: 2024-11-26
Payer: COMMERCIAL

## 2024-11-26 ENCOUNTER — OFFICE VISIT (OUTPATIENT)
Dept: OBGYN CLINIC | Facility: MEDICAL CENTER | Age: 69
End: 2024-11-26

## 2024-11-26 VITALS
BODY MASS INDEX: 31.71 KG/M2 | HEIGHT: 63 IN | HEART RATE: 61 BPM | DIASTOLIC BLOOD PRESSURE: 63 MMHG | SYSTOLIC BLOOD PRESSURE: 131 MMHG | WEIGHT: 179 LBS

## 2024-11-26 DIAGNOSIS — S52.502A CLOSED FRACTURE DISTAL RADIUS AND ULNA, LEFT, INITIAL ENCOUNTER: ICD-10-CM

## 2024-11-26 DIAGNOSIS — S52.602D CLOSED FRACTURE OF DISTAL ENDS OF LEFT RADIUS AND ULNA WITH ROUTINE HEALING, SUBSEQUENT ENCOUNTER: Primary | ICD-10-CM

## 2024-11-26 DIAGNOSIS — S52.502D CLOSED FRACTURE OF DISTAL ENDS OF LEFT RADIUS AND ULNA WITH ROUTINE HEALING, SUBSEQUENT ENCOUNTER: Primary | ICD-10-CM

## 2024-11-26 DIAGNOSIS — S52.602A CLOSED FRACTURE DISTAL RADIUS AND ULNA, LEFT, INITIAL ENCOUNTER: ICD-10-CM

## 2024-11-26 PROCEDURE — 73110 X-RAY EXAM OF WRIST: CPT

## 2024-11-26 PROCEDURE — 99024 POSTOP FOLLOW-UP VISIT: CPT | Performed by: ORTHOPAEDIC SURGERY

## 2024-11-26 RX ORDER — CEFAZOLIN SODIUM 2 G/50ML
2000 SOLUTION INTRAVENOUS ONCE
OUTPATIENT
Start: 2024-11-26 | End: 2024-11-26

## 2024-11-26 RX ORDER — ACETAMINOPHEN 325 MG/1
975 TABLET ORAL ONCE
OUTPATIENT
Start: 2024-11-26 | End: 2024-11-26

## 2024-11-26 NOTE — PROGRESS NOTES
HAND & UPPER EXTREMITY OFFICE VISIT   Referred By:  No referring provider defined for this encounter.      Chief Complaint:     Acute left wrist pain  DOI 08/20/2024    Surgery:  OPEN REDUCTION W/ INTERNAL FIXATION (ORIF) Left RADIUS / ULNA (WRIST) - Left.  DOS 08/29/2024    History of Present Illness:   Patient presents now 3 months status post the above surgery. Since last visit she reports she is doing well.  She is continuing her wrist brace as directed.  She places a nonadherent gauze pad between her nick-incisional site and the brace.  She reports she is compliant with her composite fist exercise as directed as her previous appointment.  She is not moving the wrist at this time. She reports she is beginning to notice muscle atrophy in the left upper extremity with some soreness into the left elbow and triceps.  She denies any distal numbness or tingling.    Past Medical History:  Past Medical History:   Diagnosis Date    Hypertension      Past Surgical History:   Procedure Laterality Date    APPENDECTOMY      [place] OPTX DSTL RADL I-ARTIC FX/EPIPHYSL SEP 3 FRAG Left 8/29/2024    Procedure: OPEN REDUCTION W/ INTERNAL FIXATION (ORIF) Left RADIUS / ULNA (WRIST);  Surgeon: Chapin Groves MD;  Location:  MAIN OR;  Service: Orthopedics     No family history on file.  Social History     Socioeconomic History    Marital status: /Civil Union     Spouse name: Not on file    Number of children: Not on file    Years of education: Not on file    Highest education level: Not on file   Occupational History    Not on file   Tobacco Use    Smoking status: Never    Smokeless tobacco: Never   Vaping Use    Vaping status: Never Used   Substance and Sexual Activity    Alcohol use: Never    Drug use: Never    Sexual activity: Not on file   Other Topics Concern    Not on file   Social History Narrative    Not on file     Social Drivers of Health     Financial Resource Strain: Not on file   Food Insecurity: Not on file  "  Transportation Needs: Not on file   Physical Activity: Not on file   Stress: Not on file   Social Connections: Unknown (6/18/2024)    Received from [employer]     How often do you feel lonely or isolated from those around you? (Adult - for ages 18 years and over): Not on file   Intimate Partner Violence: Not on file   Housing Stability: Not on file     Scheduled Meds:  Continuous Infusions:No current facility-administered medications for this visit.    PRN Meds:.  Allergies   Allergen Reactions    Penicillins Rash       Physical Examination:    /63   Pulse 61   Ht 5' 3\" (1.6 m)   Wt 81.2 kg (179 lb)   BMI 31.71 kg/m²     Gen: A&Ox3, NAD  Cardiac: regular rate  Chest: non labored breathing  Abdomen: Non-distended    Left Upper Extremity:  Incision site well-healed without signs of infection   Swelling Negative  Sensation intact to light touch in the axillary median, ulnar, and radial nerve distributions  Able to make about 95% fist due to stiffness, significantly improved compared to her previous visit.  Warm, well-perfused digits  Cap refill <2s      Studies:  Radiographs: I personally reviewed and independently interpreted the available radiographs.  11/26/2024: Radiographs of the left wrist, multiple views, demonstrate stable alignment of her distal radius and ulnar fractures.  The ulnar fracture has healed and there is interval healing of the radius fracture on the radial side but there is still healing along the ulnar side of the radius.  Alignment seems unchanged with stable hardware. Wrist is congruent.      Assessment and Plan:  1. Closed fracture of distal ends of left radius and ulna with routine healing, subsequent encounter  XR wrist 3+ vw left    Case request operating room: REMOVAL HARDWARE RADIUS - Left (WRIST)    Case request operating room: REMOVAL HARDWARE RADIUS - Left (WRIST)          69 y.o. female presents 3 months  status post OPEN REDUCTION W/ INTERNAL FIXATION " (ORIF) Left RADIUS / ULNA (WRIST) - Left. performed on 08/29/2024. Explained to the patient on xray her proximal ulna fracture has healed and her distal radius fracture is healing. Baased on her preferences and to give a little more time for the ulnar corner to heal, we discussed planning for hardware removal January 2, 2024 to allow more time for her fracture to heal. Patient is requesting no nerve block. Explained we will plan to have her attend hand therapy post-operatively.       Risks of the surgery include but are not limited to infection, bleeding, wound healing complications, nonunion, intraoperative or perioperative fracture, loss of reduction, malunion, posttraumatic arthritis, stiffness, damage to surrounding structures, complex regional pain syndrome, anesthetic complications.  She understands these risks and wishes to proceed.  Patient had the opportunity to ask questions that were answered during today's visit. I recommend they follow up Return for Recheck postop. she expressed understanding of the plan and agreed. We encouraged them to contact our office with any questions or concerns.     Patient is agreeable to this plan and wishes to move forward with left wrist removal of hardware on January 2, 2024.         Chapin Groves MD  Hand and Upper Extremity Surgery    Scribe Attestation      I,:  Mariza Foy am acting as a scribe while in the presence of the attending physician.:       I,:  Chapin Groves MD personally performed the services described in this documentation    as scribed in my presence.:                 *This note was dictated using Dragon voice recognition software. Please excuse any word substitutions or errors.*

## 2024-11-26 NOTE — H&P
HAND & UPPER EXTREMITY OFFICE VISIT   Referred By:  No referring provider defined for this encounter.      Chief Complaint:     Acute left wrist pain  DOI 08/20/2024    Surgery:  OPEN REDUCTION W/ INTERNAL FIXATION (ORIF) Left RADIUS / ULNA (WRIST) - Left.  DOS 08/29/2024    History of Present Illness:   Patient presents now 3 months status post the above surgery. Since last visit she reports she is doing well.  She is continuing her wrist brace as directed.  She places a nonadherent gauze pad between her nick-incisional site and the brace.  She reports she is compliant with her composite fist exercise as directed as her previous appointment.  She is not moving the wrist at this time. She reports she is beginning to notice muscle atrophy in the left upper extremity with some soreness into the left elbow and triceps.  She denies any distal numbness or tingling.    Past Medical History:  Past Medical History:   Diagnosis Date    Hypertension      Past Surgical History:   Procedure Laterality Date    APPENDECTOMY      [place] OPTX DSTL RADL I-ARTIC FX/EPIPHYSL SEP 3 FRAG Left 8/29/2024    Procedure: OPEN REDUCTION W/ INTERNAL FIXATION (ORIF) Left RADIUS / ULNA (WRIST);  Surgeon: Chapin Groves MD;  Location:  MAIN OR;  Service: Orthopedics     No family history on file.  Social History     Socioeconomic History    Marital status: /Civil Union     Spouse name: Not on file    Number of children: Not on file    Years of education: Not on file    Highest education level: Not on file   Occupational History    Not on file   Tobacco Use    Smoking status: Never    Smokeless tobacco: Never   Vaping Use    Vaping status: Never Used   Substance and Sexual Activity    Alcohol use: Never    Drug use: Never    Sexual activity: Not on file   Other Topics Concern    Not on file   Social History Narrative    Not on file     Social Drivers of Health     Financial Resource Strain: Not on file   Food Insecurity: Not on file  "  Transportation Needs: Not on file   Physical Activity: Not on file   Stress: Not on file   Social Connections: Unknown (6/18/2024)    Received from [facility] [employer]     How often do you feel lonely or isolated from those around you? (Adult - for ages 18 years and over): Not on file   Intimate Partner Violence: Not on file   Housing Stability: Not on file     Scheduled Meds:  Continuous Infusions:No current facility-administered medications for this visit.    PRN Meds:.  Allergies   Allergen Reactions    Penicillins Rash       Physical Examination:    /63   Pulse 61   Ht 5' 3\" (1.6 m)   Wt 81.2 kg (179 lb)   BMI 31.71 kg/m²     Gen: A&Ox3, NAD  Cardiac: regular rate  Chest: non labored breathing  Abdomen: Non-distended    Left Upper Extremity:  Incision site well-healed without signs of infection   Swelling Negative  Sensation intact to light touch in the axillary median, ulnar, and radial nerve distributions  Able to make about 95% fist due to stiffness, significantly improved compared to her previous visit.  Warm, well-perfused digits  Cap refill <2s      Studies:  Radiographs: I personally reviewed and independently interpreted the available radiographs.  11/26/2024: Radiographs of the left wrist, multiple views, demonstrate stable alignment of her distal radius and ulnar fractures.  The ulnar fracture has healed and there is interval healing of the radius fracture on the radial side but there is still healing along the ulnar side of the radius.  Alignment seems unchanged with stable hardware. Wrist is congruent.      Assessment and Plan:  1. Closed fracture of distal ends of left radius and ulna with routine healing, subsequent encounter  XR wrist 3+ vw left    Case request operating room: REMOVAL HARDWARE RADIUS - Left (WRIST)    Case request operating room: REMOVAL HARDWARE RADIUS - Left (WRIST)          69 y.o. female presents 3 months  status post OPEN REDUCTION W/ INTERNAL FIXATION " (ORIF) Left RADIUS / ULNA (WRIST) - Left. performed on 08/29/2024. Explained to the patient on xray her proximal ulna fracture has healed and her distal radius fracture is healing. Baased on her preferences and to give a little more time for the ulnar corner to heal, we discussed planning for hardware removal January 2, 2024 to allow more time for her fracture to heal. Patient is requesting no nerve block. Explained we will plan to have her attend hand therapy post-operatively.       Risks of the surgery include but are not limited to infection, bleeding, wound healing complications, nonunion, intraoperative or perioperative fracture, loss of reduction, malunion, posttraumatic arthritis, stiffness, damage to surrounding structures, complex regional pain syndrome, anesthetic complications.  She understands these risks and wishes to proceed.  Patient had the opportunity to ask questions that were answered during today's visit. I recommend they follow up Return for Recheck postop. she expressed understanding of the plan and agreed. We encouraged them to contact our office with any questions or concerns.     Patient is agreeable to this plan and wishes to move forward with left wrist removal of hardware on January 2, 2024.         Chapin Grvoes MD  Hand and Upper Extremity Surgery    Scribe Attestation      I,:  Mariza Foy am acting as a scribe while in the presence of the attending physician.:       I,:  Chapin Groves MD personally performed the services described in this documentation    as scribed in my presence.:                 *This note was dictated using Dragon voice recognition software. Please excuse any word substitutions or errors.*

## 2024-12-19 ENCOUNTER — ANESTHESIA (OUTPATIENT)
Age: 69
End: 2024-12-19

## 2024-12-19 ENCOUNTER — ANESTHESIA EVENT (OUTPATIENT)
Age: 69
End: 2024-12-19

## 2024-12-20 NOTE — PRE-PROCEDURE INSTRUCTIONS
Pre-Surgery Instructions:   Medication Instructions    acetaminophen (TYLENOL) 500 mg tablet Uses PRN- OK to take day of surgery    lisinopril (ZESTRIL) 10 mg tablet Hold day of surgery.    metoprolol tartrate (LOPRESSOR) 50 mg tablet Take day of surgery.    sertraline (ZOLOFT) 100 mg tablet Take day of surgery.      Medication instructions for day surgery reviewed. Please use only a sip of water to take your instructed medications. Avoid all over the counter vitamins, supplements and NSAIDS for one week prior to surgery per anesthesia guidelines. Tylenol is ok to take as needed.     You will receive a call one business day prior to surgery with an arrival time and hospital directions. If your surgery is scheduled on a Monday, the hospital will be calling you on the Friday prior to your surgery. If you have not heard from anyone by 8pm, please call the hospital supervisor through the hospital  at [phone]. ([place] [phone] or [place] [phone]).    Do not eat or drink anything after midnight the night before your surgery, including candy, mints, lifesavers, or chewing gum. Do not drink alcohol 24hrs before your surgery. Try not to smoke at least 24hrs before your surgery.       Follow the pre surgery showering instructions as listed in the “My Surgical Experience Booklet” or otherwise provided by your surgeon's office. Do not use a blade to shave the surgical area 1 week before surgery. It is okay to use a clean electric clippers up to 24 hours before surgery. Do not apply any lotions, creams, including makeup, cologne, deodorant, or perfumes after showering on the day of your surgery. Do not use dry shampoo, hair spray, hair gel, or any type of hair products.     No contact lenses, eye make-up, or artificial eyelashes. Remove nail polish, including gel polish, and any artificial, gel, or acrylic nails if possible. Remove all jewelry including rings and body piercing jewelry.     Wear causal  clothing that is easy to take on and off. Consider your type of surgery.    Keep any valuables, jewelry, piercings at home. Please bring any specially ordered equipment (sling, braces) if indicated.    Arrange for a responsible person to drive you to and from the hospital on the day of your surgery. Please confirm the visitor policy for the day of your procedure when you receive your phone call with an arrival time.     Call the surgeon's office with any new illnesses, exposures, or additional questions prior to surgery.    Please reference your “My Surgical Experience Booklet” for additional information to prepare for your upcoming surgery.

## 2024-12-31 PROBLEM — S52.502D CLOSED FRACTURE OF LEFT DISTAL RADIUS AND ULNA, WITH ROUTINE HEALING, SUBSEQUENT ENCOUNTER: Status: ACTIVE | Noted: 2024-08-28

## 2024-12-31 PROBLEM — S52.602D CLOSED FRACTURE OF LEFT DISTAL RADIUS AND ULNA, WITH ROUTINE HEALING, SUBSEQUENT ENCOUNTER: Status: ACTIVE | Noted: 2024-08-28

## 2024-12-31 NOTE — DISCHARGE INSTR - AVS FIRST PAGE
POST-OPERATIVE INSTRUCTIONS  HARDWARE REMOVAL    You have just undergone a hardware removal by Dr. Groves in the operating room.  It is our wish that your postoperative recovery be as quick and comfortable as possible.  Please carefully review the following items that are important in your recovery.    Pain Control:  After any operation, a certain degree of pain is to be expected.  A prescription for narcotic pain medication as well as acetaminophen and/or ibuprofen has been electronically sent to your pharmacy. Do not exceed 3000 mg of Tylenol per day. This medication will relieve most of your pain but may not relieve all of the pain. Some pain is normal post operatively.     When you go home, please keep your operated arm elevated at all times (above the level of your heart.)  If you do this, your swelling will be diminished and your pain will be diminished as well.      Dressing Care:  After surgery, make sure that your dressing is kept dry.  You can take a shower if you cover your arm with a plastic bag, such as a newspaper bag, and use tape or rubber bands. If your dressing gets wet you may take it off, place Band-Aids on the wound and re-cover it with sterile dressings which you can obtain at your local drug store.    Please remove your dressing down to the incision 5 days after your surgery. For example, if your had surgery on a Monday, do this on Saturday.  If your surgery was on Thursday, do this on Tuesday.   If your dressing gets wet prior to removing it, please take if off and place something clean, or bandaids, on the wound.    Weight Bearing:  You should NOT bear weight >5lbs through your operative extremity. Do not push off using your operative extremity.     It is ok to move your fingers as tolerated to prevent stiffness. You may also use your operative hand to assist with light activities of daily living such as putting on clothes, brushing your teeth and eating as tolerated    Please work on these  finger range of motions exercises between now and you follow up visit:         Follow Up:  If you don't already have a scheduled postoperative appointment, please call our office for a follow-up appointment. It is best to call the day after surgery to make an appointment in 10-14 days.  At your first postoperative visit, you will be seen by either Dr. Groves, his PA; or one of their associates. The sutures will be removed and you may be asked to see a hand therapist to optimize your functional result. Each of the hand therapists that you will be referred to have received special training in the care of the hand and upper extremity.    When to Call:  It is normal to see minor staining on the hand surgery dressing after surgery. If there is significant bleeding, you are advised to call the office during regular office hours to have this checked.     If you feel that you have a surgical emergency postoperatively that requires immediate attention, please call [phone]. In addition, any emergency can also be handled by the emergency room.      If you have questions regarding your surgery postop that you feel requires attention, please call the office.   If this occurs after our regular office hours, there is a message with instructions to talk to the physician on call.

## 2025-01-01 ENCOUNTER — ANESTHESIA EVENT (OUTPATIENT)
Age: 70
End: 2025-01-01
Payer: COMMERCIAL

## 2025-01-02 ENCOUNTER — ANESTHESIA (OUTPATIENT)
Age: 70
End: 2025-01-02
Payer: COMMERCIAL

## 2025-01-02 ENCOUNTER — HOSPITAL ENCOUNTER (OUTPATIENT)
Age: 70
Setting detail: OUTPATIENT SURGERY
Discharge: HOME | End: 2025-01-02
Attending: ORTHOPAEDIC SURGERY | Admitting: ORTHOPAEDIC SURGERY
Payer: COMMERCIAL

## 2025-01-02 ENCOUNTER — HOSPITAL ENCOUNTER (OUTPATIENT)
Age: 70
Discharge: HOME | End: 2025-01-02
Payer: COMMERCIAL

## 2025-01-02 VITALS
DIASTOLIC BLOOD PRESSURE: 65 MMHG | BODY MASS INDEX: 31.22 KG/M2 | OXYGEN SATURATION: 96 % | TEMPERATURE: 97.4 F | WEIGHT: 176.2 LBS | SYSTOLIC BLOOD PRESSURE: 128 MMHG | HEART RATE: 48 BPM | HEIGHT: 63 IN | RESPIRATION RATE: 23 BRPM

## 2025-01-02 DIAGNOSIS — S52.602D CLOSED FRACTURE OF DISTAL ENDS OF LEFT RADIUS AND ULNA WITH ROUTINE HEALING, SUBSEQUENT ENCOUNTER: ICD-10-CM

## 2025-01-02 DIAGNOSIS — S52.502D CLOSED FRACTURE OF DISTAL ENDS OF LEFT RADIUS AND ULNA WITH ROUTINE HEALING, SUBSEQUENT ENCOUNTER: ICD-10-CM

## 2025-01-02 DIAGNOSIS — Z47.89 AFTERCARE FOLLOWING SURGERY OF THE MUSCULOSKELETAL SYSTEM: ICD-10-CM

## 2025-01-02 DIAGNOSIS — S52.602D CLOSED FRACTURE OF LEFT DISTAL RADIUS AND ULNA, WITH ROUTINE HEALING, SUBSEQUENT ENCOUNTER: Primary | ICD-10-CM

## 2025-01-02 DIAGNOSIS — S52.502D CLOSED FRACTURE OF LEFT DISTAL RADIUS AND ULNA, WITH ROUTINE HEALING, SUBSEQUENT ENCOUNTER: Primary | ICD-10-CM

## 2025-01-02 PROCEDURE — 73110 X-RAY EXAM OF WRIST: CPT

## 2025-01-02 PROCEDURE — 20680 REMOVAL OF IMPLANT DEEP: CPT

## 2025-01-02 PROCEDURE — NC001 PR NO CHARGE: Performed by: ORTHOPAEDIC SURGERY

## 2025-01-02 PROCEDURE — 20680 REMOVAL OF IMPLANT DEEP: CPT | Performed by: ORTHOPAEDIC SURGERY

## 2025-01-02 RX ORDER — LIDOCAINE HYDROCHLORIDE 10 MG/ML
INJECTION, SOLUTION EPIDURAL; INFILTRATION; INTRACAUDAL; PERINEURAL AS NEEDED
Status: DISCONTINUED | OUTPATIENT
Start: 2025-01-02 | End: 2025-01-02

## 2025-01-02 RX ORDER — IBUPROFEN 600 MG/1
600 TABLET, FILM COATED ORAL EVERY 6 HOURS PRN
Status: DISCONTINUED | OUTPATIENT
Start: 2025-01-02 | End: 2025-01-02 | Stop reason: HOSPADM

## 2025-01-02 RX ORDER — HYDROMORPHONE HCL/PF 1 MG/ML
0.5 SYRINGE (ML) INJECTION
Status: DISCONTINUED | OUTPATIENT
Start: 2025-01-02 | End: 2025-01-02 | Stop reason: HOSPADM

## 2025-01-02 RX ORDER — ACETAMINOPHEN 325 MG/1
650 TABLET ORAL EVERY 6 HOURS PRN
Status: DISCONTINUED | OUTPATIENT
Start: 2025-01-02 | End: 2025-01-02 | Stop reason: HOSPADM

## 2025-01-02 RX ORDER — FENTANYL CITRATE/PF 50 MCG/ML
25 SYRINGE (ML) INJECTION
Status: DISCONTINUED | OUTPATIENT
Start: 2025-01-02 | End: 2025-01-02 | Stop reason: HOSPADM

## 2025-01-02 RX ORDER — PROMETHAZINE HYDROCHLORIDE 25 MG/ML
12.5 INJECTION, SOLUTION INTRAMUSCULAR; INTRAVENOUS ONCE AS NEEDED
Status: DISCONTINUED | OUTPATIENT
Start: 2025-01-02 | End: 2025-01-02 | Stop reason: HOSPADM

## 2025-01-02 RX ORDER — LABETALOL HYDROCHLORIDE 5 MG/ML
5 INJECTION, SOLUTION INTRAVENOUS
Status: DISCONTINUED | OUTPATIENT
Start: 2025-01-02 | End: 2025-01-02 | Stop reason: HOSPADM

## 2025-01-02 RX ORDER — ACETAMINOPHEN 500 MG
1000 TABLET ORAL EVERY 8 HOURS PRN
Qty: 60 TABLET | Refills: 0 | Status: SHIPPED | OUTPATIENT
Start: 2025-01-02

## 2025-01-02 RX ORDER — OXYCODONE HYDROCHLORIDE 5 MG/1
5 TABLET ORAL EVERY 6 HOURS PRN
Status: DISCONTINUED | OUTPATIENT
Start: 2025-01-02 | End: 2025-01-02 | Stop reason: HOSPADM

## 2025-01-02 RX ORDER — ROPIVACAINE HYDROCHLORIDE 5 MG/ML
INJECTION, SOLUTION EPIDURAL; INFILTRATION; PERINEURAL
Status: COMPLETED | OUTPATIENT
Start: 2025-01-02 | End: 2025-01-02

## 2025-01-02 RX ORDER — MIDAZOLAM HYDROCHLORIDE 2 MG/2ML
INJECTION, SOLUTION INTRAMUSCULAR; INTRAVENOUS AS NEEDED
Status: DISCONTINUED | OUTPATIENT
Start: 2025-01-02 | End: 2025-01-02

## 2025-01-02 RX ORDER — IBUPROFEN 800 MG/1
800 TABLET, FILM COATED ORAL EVERY 8 HOURS PRN
Qty: 30 TABLET | Refills: 0 | Status: SHIPPED | OUTPATIENT
Start: 2025-01-02

## 2025-01-02 RX ORDER — ALBUTEROL SULFATE 0.83 MG/ML
2.5 SOLUTION RESPIRATORY (INHALATION) ONCE AS NEEDED
Status: DISCONTINUED | OUTPATIENT
Start: 2025-01-02 | End: 2025-01-02 | Stop reason: HOSPADM

## 2025-01-02 RX ORDER — ACETAMINOPHEN 325 MG/1
975 TABLET ORAL ONCE
Status: COMPLETED | OUTPATIENT
Start: 2025-01-02 | End: 2025-01-02

## 2025-01-02 RX ORDER — DOCUSATE SODIUM 100 MG/1
100 CAPSULE, LIQUID FILLED ORAL DAILY PRN
Qty: 10 CAPSULE | Refills: 0 | Status: SHIPPED | OUTPATIENT
Start: 2025-01-02

## 2025-01-02 RX ORDER — SODIUM CHLORIDE, SODIUM LACTATE, POTASSIUM CHLORIDE, CALCIUM CHLORIDE 600; 310; 30; 20 MG/100ML; MG/100ML; MG/100ML; MG/100ML
125 INJECTION, SOLUTION INTRAVENOUS CONTINUOUS
Status: DISCONTINUED | OUTPATIENT
Start: 2025-01-02 | End: 2025-01-02 | Stop reason: HOSPADM

## 2025-01-02 RX ORDER — OXYCODONE HYDROCHLORIDE 5 MG/1
5 TABLET ORAL EVERY 6 HOURS PRN
Qty: 7 TABLET | Refills: 0 | Status: SHIPPED | OUTPATIENT
Start: 2025-01-02

## 2025-01-02 RX ORDER — SODIUM CHLORIDE, SODIUM LACTATE, POTASSIUM CHLORIDE, CALCIUM CHLORIDE 600; 310; 30; 20 MG/100ML; MG/100ML; MG/100ML; MG/100ML
125 INJECTION, SOLUTION INTRAVENOUS CONTINUOUS
Status: CANCELLED | OUTPATIENT
Start: 2025-01-02

## 2025-01-02 RX ORDER — CEFAZOLIN SODIUM 2 G/50ML
2000 SOLUTION INTRAVENOUS ONCE
Status: COMPLETED | OUTPATIENT
Start: 2025-01-02 | End: 2025-01-02

## 2025-01-02 RX ORDER — ONDANSETRON 4 MG/1
4 TABLET, FILM COATED ORAL EVERY 8 HOURS PRN
Qty: 20 TABLET | Refills: 0 | Status: SHIPPED | OUTPATIENT
Start: 2025-01-02

## 2025-01-02 RX ORDER — ONDANSETRON 2 MG/ML
4 INJECTION INTRAMUSCULAR; INTRAVENOUS ONCE AS NEEDED
Status: DISCONTINUED | OUTPATIENT
Start: 2025-01-02 | End: 2025-01-02 | Stop reason: HOSPADM

## 2025-01-02 RX ORDER — MAGNESIUM HYDROXIDE 1200 MG/15ML
LIQUID ORAL AS NEEDED
Status: DISCONTINUED | OUTPATIENT
Start: 2025-01-02 | End: 2025-01-02 | Stop reason: HOSPADM

## 2025-01-02 RX ORDER — PROPOFOL 10 MG/ML
INJECTION, EMULSION INTRAVENOUS AS NEEDED
Status: DISCONTINUED | OUTPATIENT
Start: 2025-01-02 | End: 2025-01-02

## 2025-01-02 RX ORDER — PROPOFOL 10 MG/ML
INJECTION, EMULSION INTRAVENOUS CONTINUOUS PRN
Status: DISCONTINUED | OUTPATIENT
Start: 2025-01-02 | End: 2025-01-02

## 2025-01-02 RX ADMIN — CEFAZOLIN SODIUM 2000 MG: 2 SOLUTION INTRAVENOUS at 12:15

## 2025-01-02 RX ADMIN — MIDAZOLAM 2 MG: 1 INJECTION INTRAMUSCULAR; INTRAVENOUS at 10:04

## 2025-01-02 RX ADMIN — SODIUM CHLORIDE, SODIUM LACTATE, POTASSIUM CHLORIDE, AND CALCIUM CHLORIDE 125 ML/HR: .6; .31; .03; .02 INJECTION, SOLUTION INTRAVENOUS at 09:11

## 2025-01-02 RX ADMIN — PROPOFOL 100 MG: 10 INJECTION, EMULSION INTRAVENOUS at 12:24

## 2025-01-02 RX ADMIN — ACETAMINOPHEN 325MG 975 MG: 325 TABLET ORAL at 09:00

## 2025-01-02 RX ADMIN — LIDOCAINE HYDROCHLORIDE 50 MG: 10 SOLUTION INTRAVENOUS at 12:24

## 2025-01-02 RX ADMIN — PROPOFOL 120 MCG/KG/MIN: 10 INJECTION, EMULSION INTRAVENOUS at 12:25

## 2025-01-02 RX ADMIN — ROPIVACAINE HYDROCHLORIDE 30 ML: 5 INJECTION EPIDURAL; INFILTRATION; PERINEURAL at 10:08

## 2025-01-02 NOTE — OP NOTE
OPERATIVE REPORT  PATIENT NAME: Kylee Eden    :  1955  MRN: [id number]  Pt Location: [facility] OR ROOM 03    SURGERY DATE: 2025    Surgeons and Role:     * Chapin Groves MD - Primary     * Yari Slaughter PA-C - Assisting    Preop Diagnosis:  Closed fracture of distal ends of left radius and ulna with routine healing, subsequent encounter [S52.502D, S52.602D]    Post-Op Diagnosis Codes:     * Closed fracture of distal ends of left radius and ulna with routine healing, subsequent encounter [S52.502D, S52.602D]    Procedure(s):  Left - REMOVAL HARDWARE RADIUS - Left (WRIST)    Specimen(s):  * No specimens in log *    Estimated Blood Loss:   Minimal    Drains:  * No LDAs found *    Anesthesia Type:   Conscious Sedation     Operative Indications:  Closed fracture of distal ends of left radius and ulna with routine healing, subsequent encounter [S52.502D, S52.602D]    29-year-old female who underwent a distal radius open reduction and internal fixation with a wrist spanning bridge plate due to the comminution and distal nature of her fracture.  She has now healed appropriately and is indicated for plate removal.    Operative Findings:  Stable fracture site with good evidence of healing.  Fracture site stable to stress under fluoroscopic evaluation.      Complications:   None    Procedure and Technique:  The patient was greeted in the preoperative area. The risks, benefits, and alternatives of the procedure were again discussed in detail with the patient.  The patient was amenable to proceed. The operative extremity was marked.  A peripheral nerve block was placed by anesthesia preoperatively.  Patient was brought to the operating room and placed under anesthesia. A tourniquet was applied to the operative extremity. The operative extremity was prepped and draped in the usual sterile fashion. 2g ancef were administered for pre-operative antibiotic prophylaxis. A timeout was performed identifying the name  and laterality of the procedure. The limb was exsanguinated and the tourniquet was inflated.     We utilized the prior distal and proximal incisions.  We started distally and the incision was made and carried down to subcutaneous tissue.  The extensor tendon to the middle finger was mobilized and retracted ulnarly.  The plate was easily exposed and all screws removed without incident.  We then proceeded proximally.  The incision was made utilizing the prior surgical scar and dissection down through subcutaneous tissue using tenotomy scissors.  The fascia was encountered and incised in the muscles and tendons retracted radially exposing our plate on the radial shaft.  The 4 screws were removed without incident.  A Eclectic elevator was used to help free up the plate from the underlying bone distally and proximally.  In the plate was easily removed with a needle  in an antegrade fashion through the distal incision.  The distal radius was then examined under fluoroscopy.  We confirmed removal of all hardware.  The distal radius and ulnar fractures were stable and appeared well-healed.  The wrist was stable to flexion, extension, radial deviation, ulnar deviation, full pronation and supination.  After gentle manipulation she had approximately 40 degrees extension and 30 degrees flexion with full supination and pronation and a tight composite passive fist.    The tourniquet was released and hemostasis achieved. The wound was closed in layers in an interrupted fashion. Sterile dressings were applied. The patient was awoken and transported to the recovery room in stable condition.     I was present for the entire procedure., A qualified resident physician was not available., and A physician assistant was required during the procedure for retraction, tissue handling, dissection and suturing.    Patient Disposition:  PACU              SIGNATURE: Chapin Groves MD  DATE: January 2, 2025  TIME: 1:15  PM

## 2025-01-02 NOTE — ANESTHESIA PROCEDURE NOTES
Peripheral Block    Patient location during procedure: holding area  Start time: 1/2/2025 10:00 AM  Reason for block: at surgeon's request and post-op pain management  Staffing  Performed by: Oleg Bryant MD  Authorized by: Oleg Bryant MD    Preanesthetic Checklist  Completed: patient identified, IV checked, site marked, risks and benefits discussed, surgical consent, monitors and equipment checked, pre-op evaluation and timeout performed  Peripheral Block  Patient position: sitting  Prep: ChloraPrep  Patient monitoring: frequent blood pressure checks, continuous pulse oximetry and heart rate  Block type: Supraclavicular  Laterality: left  Injection technique: single-shot  Procedures: ultrasound guided, Ultrasound guidance required for the procedure to increase accuracy and safety of medication placement and decrease risk of complications.  Ultrasound permanent image saved  ropivacaine (NAROPIN) 0.5 % injection 20 mL - Perineural   30 mL - 1/2/2025 10:08:00 AM  Needle  Needle type: Stimuplex   Needle gauge: 20 G  Needle length: 4 in  Needle localization: anatomical landmarks and ultrasound guidance  Needle insertion depth: 4 cm  Assessment  Injection assessment: incremental injection, frequent aspiration, injected with ease, negative aspiration, negative for heart rate change, no paresthesia on injection, no symptoms of intraneural/intravenous injection and needle tip visualized at all times  Paresthesia pain: none  Post-procedure:  site cleaned  patient tolerated the procedure well with no immediate complications

## 2025-01-02 NOTE — ANESTHESIA POSTPROCEDURE EVALUATION
Post-Op Assessment Note    CV Status:  Stable  Pain Score: 0    Pain management: adequate       Mental Status:  Alert and awake   Hydration Status:  Euvolemic   PONV Controlled:  Controlled   Airway Patency:  Patent     Post Op Vitals Reviewed: Yes    No anethesia notable event occurred.    Staff: CRNA           Last Filed PACU Vitals:  Vitals Value Taken Time   Temp 98.4    Pulse 52    /60    Resp 16    SpO2 99

## 2025-01-02 NOTE — INTERVAL H&P NOTE
H&P reviewed. After examining the patient I find no changes in the patients condition since the H&P had been written.    Vitals:    01/02/25 0854   BP: (!) 174/83   Pulse: 57   Resp: 20   Temp: (!) 97 °F (36.1 °C)   SpO2: 96%

## 2025-01-02 NOTE — H&P
HAND & UPPER EXTREMITY OFFICE VISIT   Referred By:  No referring provider defined for this encounter.      Chief Complaint:     Acute left wrist pain  DOI 08/20/2024    Surgery:  OPEN REDUCTION W/ INTERNAL FIXATION (ORIF) Left RADIUS / ULNA (WRIST) - Left.  DOS 08/29/2024    History of Present Illness:   Patient presents now 3 months status post the above surgery. Since last visit she reports she is doing well.  She is continuing her wrist brace as directed.  She places a nonadherent gauze pad between her nick-incisional site and the brace.  She reports she is compliant with her composite fist exercise as directed as her previous appointment.  She is not moving the wrist at this time. She reports she is beginning to notice muscle atrophy in the left upper extremity with some soreness into the left elbow and triceps.  She denies any distal numbness or tingling.    Past Medical History:  Past Medical History:   Diagnosis Date    Hypertension      Past Surgical History:   Procedure Laterality Date    APPENDECTOMY      [place] OPTX DSTL RADL I-ARTIC FX/EPIPHYSL SEP 3 FRAG Left 8/29/2024    Procedure: OPEN REDUCTION W/ INTERNAL FIXATION (ORIF) Left RADIUS / ULNA (WRIST);  Surgeon: Chapin Groves MD;  Location:  MAIN OR;  Service: Orthopedics     No family history on file.  Social History     Socioeconomic History    Marital status: /Civil Union     Spouse name: Not on file    Number of children: Not on file    Years of education: Not on file    Highest education level: Not on file   Occupational History    Not on file   Tobacco Use    Smoking status: Never    Smokeless tobacco: Never   Vaping Use    Vaping status: Never Used   Substance and Sexual Activity    Alcohol use: Never    Drug use: Never    Sexual activity: Not on file   Other Topics Concern    Not on file   Social History Narrative    Not on file     Social Drivers of Health     Financial Resource Strain: Not on file   Food Insecurity: Not on file  "  Transportation Needs: Not on file   Physical Activity: Not on file   Stress: Not on file   Social Connections: Unknown (6/18/2024)    Received from [facility]     How often do you feel lonely or isolated from those around you? (Adult - for ages 18 years and over): Not on file   Intimate Partner Violence: Not on file   Housing Stability: Not on file     Scheduled Meds:  Continuous Infusions:No current facility-administered medications for this visit.    PRN Meds:.  Allergies   Allergen Reactions    Penicillins Rash       Physical Examination:    /63   Pulse 61   Ht 5' 3\" (1.6 m)   Wt 81.2 kg (179 lb)   BMI 31.71 kg/m²     Gen: A&Ox3, NAD  Cardiac: regular rate  Chest: non labored breathing  Abdomen: Non-distended    Left Upper Extremity:  Incision site well-healed without signs of infection   Swelling Negative  Sensation intact to light touch in the axillary median, ulnar, and radial nerve distributions  Able to make about 95% fist due to stiffness, significantly improved compared to her previous visit.  Warm, well-perfused digits  Cap refill <2s      Studies:  Radiographs: I personally reviewed and independently interpreted the available radiographs.  11/26/2024: Radiographs of the left wrist, multiple views, demonstrate stable alignment of her distal radius and ulnar fractures.  The ulnar fracture has healed and there is interval healing of the radius fracture on the radial side but there is still healing along the ulnar side of the radius.  Alignment seems unchanged with stable hardware. Wrist is congruent.      Assessment and Plan:  1. Closed fracture of distal ends of left radius and ulna with routine healing, subsequent encounter  XR wrist 3+ vw left    Case request operating room: REMOVAL HARDWARE RADIUS - Left (WRIST)    Case request operating room: REMOVAL HARDWARE RADIUS - Left (WRIST)          69 y.o. female presents 3 months  status post OPEN REDUCTION W/ INTERNAL FIXATION " (ORIF) Left RADIUS / ULNA (WRIST) - Left. performed on 08/29/2024. Explained to the patient on xray her proximal ulna fracture has healed and her distal radius fracture is healing. Baased on her preferences and to give a little more time for the ulnar corner to heal, we discussed planning for hardware removal January 2, 2024 to allow more time for her fracture to heal. Patient is requesting no nerve block. Explained we will plan to have her attend hand therapy post-operatively.       Risks of the surgery include but are not limited to infection, bleeding, wound healing complications, nonunion, intraoperative or perioperative fracture, loss of reduction, malunion, posttraumatic arthritis, stiffness, damage to surrounding structures, complex regional pain syndrome, anesthetic complications.  She understands these risks and wishes to proceed.  Patient had the opportunity to ask questions that were answered during today's visit. I recommend they follow up Return for Recheck postop. she expressed understanding of the plan and agreed. We encouraged them to contact our office with any questions or concerns.     Patient is agreeable to this plan and wishes to move forward with left wrist removal of hardware on January 2, 2024.         Chapin Groves MD  Hand and Upper Extremity Surgery    Scribe Attestation      I,:  Mariza Foy am acting as a scribe while in the presence of the attending physician.:       I,:  Chapin Groves MD personally performed the services described in this documentation    as scribed in my presence.:                 *This note was dictated using Dragon voice recognition software. Please excuse any word substitutions or errors.*       yes

## 2025-01-02 NOTE — ANESTHESIA PREPROCEDURE EVALUATION
Procedure:  REMOVAL HARDWARE RADIUS - Left (WRIST) (Left: Wrist)    Relevant Problems   No relevant active problems        Physical Exam    Airway    Mallampati score: II  TM Distance: >3 FB  Neck ROM: full     Dental   No notable dental hx     Cardiovascular      Pulmonary      Other Findings  post-pubertal.      Anesthesia Plan  ASA Score- 2     Anesthesia Type- regional with ASA Monitors.         Additional Monitors:     Airway Plan:     Comment: Patient seen and examined.  History reviewed.  Patient to be done under TIVA and routine monitors.  Supraclavicular block to be performed preoperatively for post-op pain.  Risks discussed with the patient. Consent obtained..       Plan Factors-Exercise tolerance (METS): >4 METS.    Chart reviewed. EKG reviewed.  Existing labs reviewed. Patient summary reviewed.                  Induction- intravenous.    Postoperative Plan- Plan for postoperative opioid use.         Informed Consent- Anesthetic plan and risks discussed with patient.  I personally reviewed this patient with the CRNA. Discussed and agreed on the Anesthesia Plan with the CRNA..

## 2025-01-03 NOTE — ANESTHESIA POSTPROCEDURE EVALUATION
Post-Op Assessment Note    CV Status:  Stable    Pain management: adequate       Mental Status:  Alert and awake   Hydration Status:  Euvolemic   PONV Controlled:  Controlled   Airway Patency:  Patent     Post Op Vitals Reviewed: Yes    No anethesia notable event occurred.    Staff: Anesthesiologist           Last Filed PACU Vitals:  Vitals Value Taken Time   Temp 97.4 °F (36.3 °C) 01/02/25 1330   Pulse 52 01/02/25 1334   /61 01/02/25 1332   Resp 25 01/02/25 1334   SpO2 95 % 01/02/25 1334   Vitals shown include unfiled device data.    Modified Peg:     Vitals Value Taken Time   Activity 2 01/02/25 1330   Respiration 2 01/02/25 1330   Circulation 2 01/02/25 1330   Consciousness 1 01/02/25 1330   Oxygen Saturation 2 01/02/25 1330     Modified Peg Score: 9

## 2025-01-17 ENCOUNTER — OFFICE VISIT (OUTPATIENT)
Dept: OBGYN CLINIC | Facility: CLINIC | Age: 70
End: 2025-01-17

## 2025-01-17 DIAGNOSIS — S52.502A CLOSED FRACTURE DISTAL RADIUS AND ULNA, LEFT, INITIAL ENCOUNTER: Primary | ICD-10-CM

## 2025-01-17 DIAGNOSIS — S52.602A CLOSED FRACTURE DISTAL RADIUS AND ULNA, LEFT, INITIAL ENCOUNTER: Primary | ICD-10-CM

## 2025-01-17 PROCEDURE — 99024 POSTOP FOLLOW-UP VISIT: CPT | Performed by: ORTHOPAEDIC SURGERY

## 2025-01-17 NOTE — PROGRESS NOTES
HAND & UPPER EXTREMITY OFFICE VISIT   Referred By:  No referring provider defined for this encounter.      Chief Complaint:     Acute left wrist pain  DOI 08/20/2024    Surgery:  REMOVAL HARDWARE RADIUS - Left (WRIST) - Left.    History of Present Illness:   Patient presents now 2 weeks status post the above surgery. Since last visit she reports she has a throbbing pain and her hand feels weak. She states she was trying to be normal with activities    Past Medical History:  Past Medical History:   Diagnosis Date    Hypertension      Past Surgical History:   Procedure Laterality Date    APPENDECTOMY      SD OPTX DSTL RADL I-ARTIC FX/EPIPHYSL SEP 3+ FRAG Left 8/29/2024    Procedure: OPEN REDUCTION W/ INTERNAL FIXATION (ORIF) Left RADIUS / ULNA (WRIST);  Surgeon: Chapin Groves MD;  Location: [facility];  Service: Orthopedics    SD REMOVAL IMPLANT DEEP Left 1/2/2025    Procedure: REMOVAL HARDWARE RADIUS - Left (WRIST);  Surgeon: Chapin Groves MD;  Location: [facility];  Service: Orthopedics     No family history on file.  Social History     Socioeconomic History    Marital status: /Civil Union     Spouse name: Not on file    Number of children: Not on file    Years of education: Not on file    Highest education level: Not on file   Occupational History    Not on file   Tobacco Use    Smoking status: Never    Smokeless tobacco: Never   Vaping Use    Vaping status: Never Used   Substance and Sexual Activity    Alcohol use: Never    Drug use: Never    Sexual activity: Not on file   Other Topics Concern    Not on file   Social History Narrative    Not on file     Social Drivers of Health     Financial Resource Strain: Not on file   Food Insecurity: Not on file   Transportation Needs: Not on file   Physical Activity: Not on file   Stress: Not on file   Social Connections: Unknown (6/18/2024)    Received from [employer]     How often do you feel lonely or isolated from those  around you? (Adult - for ages 18 years and over): Not on file   Intimate Partner Violence: Not on file   Housing Stability: Not on file     Scheduled Meds:  Continuous Infusions:No current facility-administered medications for this visit.    PRN Meds:.  Allergies   Allergen Reactions    Penicillins Rash       Physical Examination:    There were no vitals taken for this visit.    Gen: A&Ox3, NAD      Left Upper Extremity:  Dressing clean and dry, removed  Underlying incision healing well without signs of infection Sutures intact  Sensation intact to light touch in the axillary median, ulnar, and radial nerve distributions  2+RP  Composite fist   AROM wrist: Flexion 20 deg; Extension 10 deg  70 deg supination 80 deg pronation    Studies:  No new images obtained/reviewed        Assessment and Plan:  1. Closed fracture distal radius and ulna, left, initial encounter            69 y.o. female presents 2 weeks status post REMOVAL HARDWARE RADIUS - Left (WRIST) - Left.   Sutures removed in the office today. she may continue regular hygiene and apply lotions/oils and perform scar massage as needed. she may participate in all activities as tolerated without further restrictions. We discussed her pains are most likely from weakness and stiffness from immobility for such a long time. She may start ROM of her wrist as tolerated. She may wean out of her brace as well.  She will continue to work on ROM and strength at home per her request but if she is not making adequate progress, she may attend PT in the future.     Declined DEXA scan.     I recommend they follow up Return in about 5 weeks (around 2/21/2025) for Recheck.  she expressed understanding of the plan and agreed. We encouraged them to contact our office with any questions or concerns.         Chapin Groves MD  Hand and Upper Extremity Surgery          *This note was dictated using Dragon voice recognition software. Please excuse any word substitutions or  errors.*

## 2025-02-21 ENCOUNTER — OFFICE VISIT (OUTPATIENT)
Dept: OBGYN CLINIC | Facility: CLINIC | Age: 70
End: 2025-02-21

## 2025-02-21 DIAGNOSIS — S52.602A CLOSED FRACTURE DISTAL RADIUS AND ULNA, LEFT, INITIAL ENCOUNTER: Primary | ICD-10-CM

## 2025-02-21 DIAGNOSIS — S52.502A CLOSED FRACTURE DISTAL RADIUS AND ULNA, LEFT, INITIAL ENCOUNTER: Primary | ICD-10-CM

## 2025-02-21 PROCEDURE — 99024 POSTOP FOLLOW-UP VISIT: CPT | Performed by: ORTHOPAEDIC SURGERY

## 2025-02-21 NOTE — PROGRESS NOTES
HAND & UPPER EXTREMITY OFFICE VISIT   Referred By:  No referring provider defined for this encounter.      Chief Complaint:     Post op    Surgery:  1/2/25 - Right wrist removal of hardware    History of Present Illness:   Patient presents now 6 weeks status post the above surgery. Since last visit she reports she has been working on wrist ROM. She is pleased that her wrist pain is improving. A little soreness in the morning and at the end of the day but otherwise able to perform all ADLs without limitation. She was recently able to paint a window pane with her right wrist.     Past Medical History:  Past Medical History:   Diagnosis Date    Hypertension      Past Surgical History:   Procedure Laterality Date    APPENDECTOMY      AR OPTX DSTL RADL I-ARTIC FX/EPIPHYSL SEP 3+ FRAG Left 8/29/2024    Procedure: OPEN REDUCTION W/ INTERNAL FIXATION (ORIF) Left RADIUS / ULNA (WRIST);  Surgeon: Chapin Groves MD;  Location:  [facility];  Service: Orthopedics    AR REMOVAL IMPLANT DEEP Left 1/2/2025    Procedure: REMOVAL HARDWARE RADIUS - Left (WRIST);  Surgeon: Chapin Groves MD;  Location: [facility];  Service: Orthopedics     No family history on file.  Social History     Socioeconomic History    Marital status: /Civil Union     Spouse name: Not on file    Number of children: Not on file    Years of education: Not on file    Highest education level: Not on file   Occupational History    Not on file   Tobacco Use    Smoking status: Never    Smokeless tobacco: Never   Vaping Use    Vaping status: Never Used   Substance and Sexual Activity    Alcohol use: Never    Drug use: Never    Sexual activity: Not on file   Other Topics Concern    Not on file   Social History Narrative    Not on file     Social Drivers of Health     Financial Resource Strain: Not on file   Food Insecurity: Not on file   Transportation Needs: Not on file   Physical Activity: Not on file   Stress: Not on file   Social Connections:  Unknown (6/18/2024)    Received from [email or web address]     How often do you feel lonely or isolated from those around you? (Adult - for ages 18 years and over): Not on file   Intimate Partner Violence: Not on file   Housing Stability: Not on file     Scheduled Meds:  Continuous Infusions:No current facility-administered medications for this visit.    PRN Meds:.  Allergies   Allergen Reactions    Penicillins Rash       Physical Examination:    There were no vitals taken for this visit.    Gen: A&Ox3, NAD    Right Upper Extremity:  Dressing clean and dry, removed  Incision healing without signs of infection  Sensation intact to light touch in the axillary median, ulnar, and radial nerve distributions  5/5 motor    Composite fist and full finger extension  AROM wrist: 30 flexion, 30 extension, full supination/pronation  2+RP    Studies:  Radiographs: No new imaging.    Assessment and Plan:  1. Closed fracture distal radius and ulna, left, initial encounter            69 y.o. female presents 6 weeks status post removal of hardware right wrist. Her ROM has continued to improve and she is having decreased pain, improved functionality. Overall, I am pleased with her recovery. She can continue to work on her wrist ROM on her own. I will plan to see her back in 3 months for re-evaluation of her wrist motion.    Patient declined DEXA scan and bone health evaluation.     she expressed understanding of the plan and agreed. We encouraged them to contact our office with any questions or concerns.         Chapin Groves MD  Hand and Upper Extremity Surgery          *This note was dictated using Dragon voice recognition software. Please excuse any word substitutions or errors.*

## 2025-06-27 ENCOUNTER — OFFICE VISIT (OUTPATIENT)
Dept: OBGYN CLINIC | Facility: CLINIC | Age: 70
End: 2025-06-27
Payer: COMMERCIAL

## 2025-06-27 DIAGNOSIS — S52.502D CLOSED FRACTURE OF DISTAL ENDS OF LEFT RADIUS AND ULNA WITH ROUTINE HEALING, SUBSEQUENT ENCOUNTER: Primary | ICD-10-CM

## 2025-06-27 DIAGNOSIS — S52.602D CLOSED FRACTURE OF DISTAL ENDS OF LEFT RADIUS AND ULNA WITH ROUTINE HEALING, SUBSEQUENT ENCOUNTER: Primary | ICD-10-CM

## 2025-06-27 PROBLEM — Z98.890 HISTORY OF OPEN REDUCTION AND INTERNAL FIXATION (ORIF) PROCEDURE: Status: ACTIVE | Noted: 2025-06-27

## 2025-06-27 PROCEDURE — 99213 OFFICE O/P EST LOW 20 MIN: CPT | Performed by: ORTHOPAEDIC SURGERY

## 2025-06-27 NOTE — PROGRESS NOTES
HAND & UPPER EXTREMITY OFFICE VISIT       Assessment and Plan:    Chief Complaint:     Left wrist stiffness and pain  Approximately 6 months status post removal of hardware of left wrist.  Date of surgery January 2, 2025  9 months from ORIF of left radius and ulna date of surgery August 29, 2024    Previous History:   Previously seen on 2/21/2025. At that point was doing well with regards to her wrist function and was able to partake in more rigorous activities.    Interval History:  Kylee is doing well and notes that she is able to partake in most activities of daily living without significant difficulty.  She states that her greatest complaint is having significant apprehension with pushing off of her wrist such as to get up off the ground.  Otherwise, she states that she is able to utilize the wrist and hand without significant difficulty.  She is happy with her progress at this point and notes that her range of motion has significantly improved.  She notes that she is still a little bit weaker than the right side.  She does also have apprehension of future injuries that can be more easily had due to her history of wrist fracture.  Today she denies any distal paresthesias.     Past Medical History:  Past Medical History[1]  Past Surgical History[2]  Family History[3]  Social History     Socioeconomic History    Marital status: /Civil Union     Spouse name: Not on file    Number of children: Not on file    Years of education: Not on file    Highest education level: Not on file   Occupational History    Not on file   Tobacco Use    Smoking status: Never    Smokeless tobacco: Never   Vaping Use    Vaping status: Never Used   Substance and Sexual Activity    Alcohol use: Never    Drug use: Never    Sexual activity: Not on file   Other Topics Concern    Not on file   Social History Narrative    Not on file     Social Drivers of Health     Financial Resource Strain: Not on file   Food Insecurity: Not on file    Transportation Needs: Not on file   Physical Activity: Not on file   Stress: Not on file   Social Connections: Unknown (6/18/2024)    Received from [employer]     How often do you feel lonely or isolated from those around you? (Adult - for ages 18 years and over): Not on file   Intimate Partner Violence: Not on file   Housing Stability: Not on file     Scheduled Meds:  Continuous Infusions:No current facility-administered medications for this visit.    PRN Meds:.  Allergies[4]    Physical Examination:    There were no vitals taken for this visit.    Gen: A&Ox3, NAD  Cardiac: regular rate  Chest: non labored breathing  Abdomen: Non-distended        Left Upper Extremity:  Skin CDI  Surgical scars are well healed  No obvious deformity of the shoulder, arm, elbow, forearm, wrist, hand  TTP: negative  Sensation intact to light touch in the axillary median, ulnar, and radial nerve distributions  5/5 motor   Warm, well-perfused digits  Wrist extension: 80  Wrist flexion: 70  Cap refill <2s      Studies:  No new images reviewed today      Labs:  none    Assessment & Plan  Closed fracture of distal ends of left radius and ulna with routine healing, subsequent encounter  Kylee is doing well now approximately 6 months status post removal of hardware of the left wrist.  I did have a lengthy discussion with her and her  today with regards to further delineation of care.  I did explain that it is safe for her to push off of her wrist as a does have enough bony healing to support her weight.  There is a risk for recurrent fracture if she does suffer another fall.  However she has not had a significantly higher risk of refracture when compared to the contralateral side.  She is encouraged to partake in activities of daily living as tolerated with no specific restrictions.  She may utilize oral analgesics as needed.  At this point she may follow-up on an as-needed basis.           Chapin Groves,  MD  Hand and Upper Extremity Surgery      *This note was dictated using Dragon voice recognition software. Please excuse any word substitutions or errors.*       Scribe Attestation      I,:  Guzman Davenport am acting as a scribe while in the presence of the attending physician.:       I,:  Chapin Groves MD personally performed the services described in this documentation    as scribed in my presence.:                   [1]   Past Medical History:  Diagnosis Date    Hypertension    [2]   Past Surgical History:  Procedure Laterality Date    APPENDECTOMY      [place] OPTX DSTL RADL I-ARTIC FX/EPIPHYSL SEP 3+ FRAG Left 8/29/2024    Procedure: OPEN REDUCTION W/ INTERNAL FIXATION (ORIF) Left RADIUS / ULNA (WRIST);  Surgeon: Chapin Groves MD;  Location: [facility];  Service: Orthopedics    [place] REMOVAL IMPLANT DEEP Left 1/2/2025    Procedure: REMOVAL HARDWARE RADIUS - Left (WRIST);  Surgeon: Chapin Groves MD;  Location: [facility];  Service: Orthopedics   [3] No family history on file.  [4]   Allergies  Allergen Reactions    Penicillins Rash

## 2025-06-27 NOTE — ASSESSMENT & PLAN NOTE
Kylee is doing well now approximately 6 months status post removal of hardware of the left wrist.  I did have a lengthy discussion with her and her  today with regards to further delineation of care.  I did explain that it is safe for her to push off of her wrist as a does have enough bony healing to support her weight.  There is a risk for recurrent fracture if she does suffer another fall.  However she has not had a significantly higher risk of refracture when compared to the contralateral side.  She is encouraged to partake in activities of daily living as tolerated with no specific restrictions.  She may utilize oral analgesics as needed.  At this point she may follow-up on an as-needed basis.

## (undated) DEVICE — PREP PAD BNS: Brand: CONVERTORS

## (undated) DEVICE — 2.8 MM X 5 IN QUICK RELEASE DRILL: Brand: ACUMED

## (undated) DEVICE — ACE WRAP 4 IN UNSTERILE

## (undated) DEVICE — GLOVE SRG BIOGEL 6.5

## (undated) DEVICE — STERILE BETHLEHEM PLASTIC HAND: Brand: CARDINAL HEALTH

## (undated) DEVICE — CHLORHEXIDINE 4PCT 4 OZ

## (undated) DEVICE — ACE WRAP 4 IN STERILE

## (undated) DEVICE — CUFF TOURNIQUET 18 X 4 IN QUICK CONNECT DISP 1 BLADDER

## (undated) DEVICE — GLOVE INDICATOR PI UNDERGLOVE SZ 6.5 BLUE

## (undated) DEVICE — ANTIBACTERIAL UNDYED BRAIDED (POLYGLACTIN 910), SYNTHETIC ABSORBABLE SUTURE: Brand: COATED VICRYL

## (undated) DEVICE — PREMIUM DRY TRAY LF: Brand: MEDLINE INDUSTRIES, INC.

## (undated) DEVICE — GAUZE SPONGES,16 PLY: Brand: CURITY

## (undated) DEVICE — GLOVE PI ULTRA TOUCH SZ.8.0

## (undated) DEVICE — TUBING SUCTION 5MM X 12 FT

## (undated) DEVICE — SUT VICRYL 3-0 SH 27 IN J416H

## (undated) DEVICE — OCCLUSIVE GAUZE STRIP,3% BISMUTH TRIBROMOPHENATE IN PETROLATUM BLEND: Brand: XEROFORM

## (undated) DEVICE — DRAPE C-ARM X-RAY

## (undated) DEVICE — GLOVE INDICATOR PI UNDERGLOVE SZ 8 BLUE

## (undated) DEVICE — CAST PADDING 4 IN SYNTHETIC NON-STRL

## (undated) DEVICE — IMPLANTABLE DEVICE: Type: IMPLANTABLE DEVICE | Site: WRIST | Status: NON-FUNCTIONAL

## (undated) DEVICE — PENCIL ELECTROSURG E-Z CLEAN -0035H

## (undated) DEVICE — PADDING CAST 4 IN  COTTON STRL

## (undated) DEVICE — INTENDED FOR TISSUE SEPARATION, AND OTHER PROCEDURES THAT REQUIRE A SHARP SURGICAL BLADE TO PUNCTURE OR CUT.: Brand: BARD-PARKER ® CARBON RIB-BACK BLADES

## (undated) DEVICE — C-ARM: Brand: UNBRANDED

## (undated) DEVICE — SUT ETHILON 4-0 PS-2 18 IN 1667H

## (undated) DEVICE — 2.0MM QUICK RELEASE DRILL: Brand: ACUMED

## (undated) DEVICE — PAD GROUNDING DUAL ADULT

## (undated) DEVICE — PLATE TACK: Brand: ACUMED